# Patient Record
Sex: FEMALE | Race: BLACK OR AFRICAN AMERICAN | NOT HISPANIC OR LATINO | URBAN - METROPOLITAN AREA
[De-identification: names, ages, dates, MRNs, and addresses within clinical notes are randomized per-mention and may not be internally consistent; named-entity substitution may affect disease eponyms.]

---

## 2021-10-20 ENCOUNTER — HISTORICAL (OUTPATIENT)
Dept: ADMINISTRATIVE | Facility: HOSPITAL | Age: 46
End: 2021-10-20

## 2021-10-20 LAB
ABS NEUT (OLG): 2.65 X10(3)/MCL (ref 2.1–9.2)
BASOPHILS # BLD AUTO: 0 X10(3)/MCL (ref 0–0.2)
BASOPHILS NFR BLD AUTO: 1 %
EOSINOPHIL # BLD AUTO: 0.3 X10(3)/MCL (ref 0–0.9)
EOSINOPHIL NFR BLD AUTO: 6 %
ERYTHROCYTE [DISTWIDTH] IN BLOOD BY AUTOMATED COUNT: 17.2 % (ref 11.5–17)
HCT VFR BLD AUTO: 31.4 % (ref 37–47)
HGB BLD-MCNC: 8.6 GM/DL (ref 12–16)
IRON SATN MFR SERPL: 6 % (ref 20–50)
IRON SERPL-MCNC: 23 UG/DL (ref 50–170)
LYMPHOCYTES # BLD AUTO: 1.2 X10(3)/MCL (ref 0.6–4.6)
LYMPHOCYTES NFR BLD AUTO: 26 %
MCH RBC QN AUTO: 21.4 PG (ref 27–31)
MCHC RBC AUTO-ENTMCNC: 27.4 GM/DL (ref 33–36)
MCV RBC AUTO: 78.3 FL (ref 80–94)
MONOCYTES # BLD AUTO: 0.4 X10(3)/MCL (ref 0.1–1.3)
MONOCYTES NFR BLD AUTO: 8 %
NEUTROPHILS # BLD AUTO: 2.65 X10(3)/MCL (ref 2.1–9.2)
NEUTROPHILS NFR BLD AUTO: 59 %
PLATELET # BLD AUTO: 290 X10(3)/MCL (ref 130–400)
PMV BLD AUTO: 10.7 FL (ref 9.4–12.4)
RBC # BLD AUTO: 4.01 X10(6)/MCL (ref 4.2–5.4)
TIBC SERPL-MCNC: 385 UG/DL (ref 70–310)
TIBC SERPL-MCNC: 408 UG/DL (ref 250–450)
TRANSFERRIN SERPL-MCNC: 363 MG/DL (ref 180–382)
WBC # SPEC AUTO: 4.5 X10(3)/MCL (ref 4.5–11.5)

## 2021-11-23 LAB
HUMAN PAPILLOMAVIRUS (HPV): NORMAL
PAP RECOMMENDATION EXT: NORMAL
PAP SMEAR: NORMAL
POC BETA-HCG (QUAL): NEGATIVE

## 2021-12-07 ENCOUNTER — HISTORICAL (OUTPATIENT)
Dept: RADIOLOGY | Facility: HOSPITAL | Age: 46
End: 2021-12-07

## 2022-01-11 LAB — BCS RECOMMENDATION EXT: NORMAL

## 2022-01-19 LAB — POC BETA-HCG (QUAL): NEGATIVE

## 2022-07-28 ENCOUNTER — PATIENT OUTREACH (OUTPATIENT)
Dept: ADMINISTRATIVE | Facility: HOSPITAL | Age: 47
End: 2022-07-28
Payer: MEDICAID

## 2022-08-24 PROBLEM — D50.9 IRON DEFICIENCY ANEMIA, UNSPECIFIED: Status: ACTIVE | Noted: 2022-08-24

## 2022-08-25 ENCOUNTER — INFUSION (OUTPATIENT)
Dept: INFUSION THERAPY | Facility: HOSPITAL | Age: 47
End: 2022-08-25
Attending: NURSE PRACTITIONER
Payer: MEDICAID

## 2022-08-25 VITALS
HEART RATE: 89 BPM | OXYGEN SATURATION: 100 % | RESPIRATION RATE: 18 BRPM | BODY MASS INDEX: 26.31 KG/M2 | TEMPERATURE: 98 F | HEIGHT: 60 IN | WEIGHT: 134 LBS

## 2022-08-25 DIAGNOSIS — D50.9 IRON DEFICIENCY ANEMIA, UNSPECIFIED IRON DEFICIENCY ANEMIA TYPE: Primary | ICD-10-CM

## 2022-08-25 PROCEDURE — 96365 THER/PROPH/DIAG IV INF INIT: CPT

## 2022-08-25 PROCEDURE — 63600175 PHARM REV CODE 636 W HCPCS

## 2022-08-25 PROCEDURE — A4216 STERILE WATER/SALINE, 10 ML: HCPCS

## 2022-08-25 PROCEDURE — 25000003 PHARM REV CODE 250

## 2022-08-25 RX ORDER — SODIUM CHLORIDE 0.9 % (FLUSH) 0.9 %
10 SYRINGE (ML) INJECTION
Status: CANCELLED | OUTPATIENT
Start: 2022-09-01

## 2022-08-25 RX ORDER — SODIUM CHLORIDE 0.9 % (FLUSH) 0.9 %
10 SYRINGE (ML) INJECTION
Status: ACTIVE | OUTPATIENT
Start: 2022-08-25

## 2022-08-25 RX ORDER — HEPARIN 100 UNIT/ML
500 SYRINGE INTRAVENOUS
Status: CANCELLED | OUTPATIENT
Start: 2022-09-01

## 2022-08-25 RX ORDER — HEPARIN 100 UNIT/ML
500 SYRINGE INTRAVENOUS
Status: ACTIVE | OUTPATIENT
Start: 2022-08-25

## 2022-08-25 RX ADMIN — Medication 20 ML: at 10:08

## 2022-08-25 RX ADMIN — SODIUM CHLORIDE 125 MG: 9 INJECTION, SOLUTION INTRAVENOUS at 09:08

## 2022-09-06 ENCOUNTER — INFUSION (OUTPATIENT)
Dept: INFUSION THERAPY | Facility: HOSPITAL | Age: 47
End: 2022-09-06
Attending: NURSE PRACTITIONER
Payer: MEDICAID

## 2022-09-06 VITALS
HEART RATE: 85 BPM | OXYGEN SATURATION: 98 % | HEIGHT: 60 IN | WEIGHT: 134 LBS | TEMPERATURE: 98 F | BODY MASS INDEX: 26.31 KG/M2 | RESPIRATION RATE: 18 BRPM

## 2022-09-06 DIAGNOSIS — D50.9 IRON DEFICIENCY ANEMIA, UNSPECIFIED IRON DEFICIENCY ANEMIA TYPE: Primary | ICD-10-CM

## 2022-09-06 PROCEDURE — 63600175 PHARM REV CODE 636 W HCPCS

## 2022-09-06 PROCEDURE — A4216 STERILE WATER/SALINE, 10 ML: HCPCS

## 2022-09-06 PROCEDURE — 25000003 PHARM REV CODE 250

## 2022-09-06 PROCEDURE — 96365 THER/PROPH/DIAG IV INF INIT: CPT

## 2022-09-06 RX ORDER — SODIUM CHLORIDE 0.9 % (FLUSH) 0.9 %
10 SYRINGE (ML) INJECTION
Status: ACTIVE | OUTPATIENT
Start: 2022-09-06

## 2022-09-06 RX ORDER — HEPARIN 100 UNIT/ML
500 SYRINGE INTRAVENOUS
Status: CANCELLED | OUTPATIENT
Start: 2022-09-13

## 2022-09-06 RX ORDER — HEPARIN 100 UNIT/ML
500 SYRINGE INTRAVENOUS
Status: ACTIVE | OUTPATIENT
Start: 2022-09-06

## 2022-09-06 RX ORDER — SODIUM CHLORIDE 0.9 % (FLUSH) 0.9 %
10 SYRINGE (ML) INJECTION
Status: CANCELLED | OUTPATIENT
Start: 2022-09-13

## 2022-09-06 RX ADMIN — Medication 20 ML: at 10:09

## 2022-09-06 RX ADMIN — SODIUM CHLORIDE 125 MG: 9 INJECTION, SOLUTION INTRAVENOUS at 09:09

## 2022-09-06 RX ADMIN — Medication 10 ML: at 09:09

## 2022-09-07 ENCOUNTER — INFUSION (OUTPATIENT)
Dept: INFUSION THERAPY | Facility: HOSPITAL | Age: 47
End: 2022-09-07
Attending: NURSE PRACTITIONER
Payer: MEDICAID

## 2022-09-07 VITALS
TEMPERATURE: 98 F | RESPIRATION RATE: 18 BRPM | BODY MASS INDEX: 26.27 KG/M2 | WEIGHT: 133.81 LBS | HEIGHT: 60 IN | OXYGEN SATURATION: 99 % | HEART RATE: 79 BPM

## 2022-09-07 DIAGNOSIS — D50.9 IRON DEFICIENCY ANEMIA, UNSPECIFIED IRON DEFICIENCY ANEMIA TYPE: Primary | ICD-10-CM

## 2022-09-07 PROCEDURE — 25000003 PHARM REV CODE 250

## 2022-09-07 PROCEDURE — 63600175 PHARM REV CODE 636 W HCPCS

## 2022-09-07 PROCEDURE — 96365 THER/PROPH/DIAG IV INF INIT: CPT

## 2022-09-07 RX ORDER — HEPARIN 100 UNIT/ML
500 SYRINGE INTRAVENOUS
Status: ACTIVE | OUTPATIENT
Start: 2022-09-07

## 2022-09-07 RX ORDER — SODIUM CHLORIDE 0.9 % (FLUSH) 0.9 %
10 SYRINGE (ML) INJECTION
Status: CANCELLED | OUTPATIENT
Start: 2022-09-14

## 2022-09-07 RX ORDER — SODIUM CHLORIDE 0.9 % (FLUSH) 0.9 %
10 SYRINGE (ML) INJECTION
Status: ACTIVE | OUTPATIENT
Start: 2022-09-07

## 2022-09-07 RX ORDER — HEPARIN 100 UNIT/ML
500 SYRINGE INTRAVENOUS
Status: CANCELLED | OUTPATIENT
Start: 2022-09-14

## 2022-09-07 RX ADMIN — SODIUM CHLORIDE 125 MG: 9 INJECTION, SOLUTION INTRAVENOUS at 09:09

## 2022-09-13 ENCOUNTER — INFUSION (OUTPATIENT)
Dept: INFUSION THERAPY | Facility: HOSPITAL | Age: 47
End: 2022-09-13
Attending: NURSE PRACTITIONER
Payer: MEDICAID

## 2022-09-13 VITALS
HEART RATE: 75 BPM | HEIGHT: 60 IN | TEMPERATURE: 98 F | WEIGHT: 133.81 LBS | OXYGEN SATURATION: 99 % | BODY MASS INDEX: 26.27 KG/M2 | RESPIRATION RATE: 18 BRPM

## 2022-09-13 DIAGNOSIS — D50.9 IRON DEFICIENCY ANEMIA, UNSPECIFIED IRON DEFICIENCY ANEMIA TYPE: Primary | ICD-10-CM

## 2022-09-13 PROCEDURE — 96365 THER/PROPH/DIAG IV INF INIT: CPT

## 2022-09-13 PROCEDURE — 63600175 PHARM REV CODE 636 W HCPCS

## 2022-09-13 PROCEDURE — A4216 STERILE WATER/SALINE, 10 ML: HCPCS

## 2022-09-13 PROCEDURE — 25000003 PHARM REV CODE 250

## 2022-09-13 RX ORDER — SODIUM CHLORIDE 0.9 % (FLUSH) 0.9 %
10 SYRINGE (ML) INJECTION
Status: ACTIVE | OUTPATIENT
Start: 2022-09-13

## 2022-09-13 RX ORDER — HEPARIN 100 UNIT/ML
500 SYRINGE INTRAVENOUS
Status: CANCELLED | OUTPATIENT
Start: 2022-09-20

## 2022-09-13 RX ORDER — SODIUM CHLORIDE 0.9 % (FLUSH) 0.9 %
10 SYRINGE (ML) INJECTION
Status: CANCELLED | OUTPATIENT
Start: 2022-09-20

## 2022-09-13 RX ORDER — HEPARIN 100 UNIT/ML
500 SYRINGE INTRAVENOUS
Status: ACTIVE | OUTPATIENT
Start: 2022-09-13

## 2022-09-13 RX ADMIN — SODIUM CHLORIDE 125 MG: 9 INJECTION, SOLUTION INTRAVENOUS at 09:09

## 2022-09-13 RX ADMIN — Medication 20 ML: at 10:09

## 2022-09-13 RX ADMIN — Medication 10 ML: at 09:09

## 2022-09-15 ENCOUNTER — INFUSION (OUTPATIENT)
Dept: INFUSION THERAPY | Facility: HOSPITAL | Age: 47
End: 2022-09-15
Attending: NURSE PRACTITIONER
Payer: MEDICAID

## 2022-09-15 VITALS
HEART RATE: 83 BPM | WEIGHT: 134 LBS | BODY MASS INDEX: 26.31 KG/M2 | OXYGEN SATURATION: 100 % | RESPIRATION RATE: 18 BRPM | DIASTOLIC BLOOD PRESSURE: 74 MMHG | SYSTOLIC BLOOD PRESSURE: 124 MMHG | TEMPERATURE: 99 F | HEIGHT: 60 IN

## 2022-09-15 DIAGNOSIS — D50.9 IRON DEFICIENCY ANEMIA, UNSPECIFIED IRON DEFICIENCY ANEMIA TYPE: Primary | ICD-10-CM

## 2022-09-15 PROCEDURE — 25000003 PHARM REV CODE 250

## 2022-09-15 PROCEDURE — A4216 STERILE WATER/SALINE, 10 ML: HCPCS

## 2022-09-15 PROCEDURE — 63600175 PHARM REV CODE 636 W HCPCS

## 2022-09-15 PROCEDURE — 96365 THER/PROPH/DIAG IV INF INIT: CPT

## 2022-09-15 RX ORDER — SODIUM CHLORIDE 0.9 % (FLUSH) 0.9 %
10 SYRINGE (ML) INJECTION
Status: ACTIVE | OUTPATIENT
Start: 2022-09-15

## 2022-09-15 RX ORDER — HEPARIN 100 UNIT/ML
500 SYRINGE INTRAVENOUS
Status: CANCELLED | OUTPATIENT
Start: 2022-09-20

## 2022-09-15 RX ORDER — SODIUM CHLORIDE 0.9 % (FLUSH) 0.9 %
10 SYRINGE (ML) INJECTION
Status: CANCELLED | OUTPATIENT
Start: 2022-09-20

## 2022-09-15 RX ORDER — HEPARIN 100 UNIT/ML
500 SYRINGE INTRAVENOUS
Status: ACTIVE | OUTPATIENT
Start: 2022-09-15

## 2022-09-15 RX ADMIN — Medication 20 ML: at 09:09

## 2022-09-15 RX ADMIN — SODIUM CHLORIDE 125 MG: 9 INJECTION, SOLUTION INTRAVENOUS at 08:09

## 2022-09-15 RX ADMIN — Medication 10 ML: at 08:09

## 2022-09-20 ENCOUNTER — INFUSION (OUTPATIENT)
Dept: INFUSION THERAPY | Facility: HOSPITAL | Age: 47
End: 2022-09-20
Attending: NURSE PRACTITIONER
Payer: MEDICAID

## 2022-09-20 VITALS
OXYGEN SATURATION: 100 % | BODY MASS INDEX: 26.31 KG/M2 | HEART RATE: 74 BPM | WEIGHT: 134 LBS | SYSTOLIC BLOOD PRESSURE: 132 MMHG | TEMPERATURE: 98 F | HEIGHT: 60 IN | DIASTOLIC BLOOD PRESSURE: 76 MMHG | RESPIRATION RATE: 18 BRPM

## 2022-09-20 DIAGNOSIS — D50.9 IRON DEFICIENCY ANEMIA, UNSPECIFIED IRON DEFICIENCY ANEMIA TYPE: Primary | ICD-10-CM

## 2022-09-20 PROCEDURE — 25000003 PHARM REV CODE 250

## 2022-09-20 PROCEDURE — 96365 THER/PROPH/DIAG IV INF INIT: CPT

## 2022-09-20 PROCEDURE — 63600175 PHARM REV CODE 636 W HCPCS

## 2022-09-20 PROCEDURE — A4216 STERILE WATER/SALINE, 10 ML: HCPCS

## 2022-09-20 RX ORDER — HEPARIN 100 UNIT/ML
500 SYRINGE INTRAVENOUS
Status: ACTIVE | OUTPATIENT
Start: 2022-09-20

## 2022-09-20 RX ORDER — SODIUM CHLORIDE 0.9 % (FLUSH) 0.9 %
10 SYRINGE (ML) INJECTION
Status: ACTIVE | OUTPATIENT
Start: 2022-09-20

## 2022-09-20 RX ORDER — HEPARIN 100 UNIT/ML
500 SYRINGE INTRAVENOUS
Status: CANCELLED | OUTPATIENT
Start: 2022-09-27

## 2022-09-20 RX ORDER — SODIUM CHLORIDE 0.9 % (FLUSH) 0.9 %
10 SYRINGE (ML) INJECTION
Status: CANCELLED | OUTPATIENT
Start: 2022-09-27

## 2022-09-20 RX ADMIN — SODIUM CHLORIDE 125 MG: 9 INJECTION, SOLUTION INTRAVENOUS at 09:09

## 2022-09-20 RX ADMIN — Medication 30 ML: at 11:09

## 2022-09-22 ENCOUNTER — HISTORICAL (OUTPATIENT)
Dept: ADMINISTRATIVE | Facility: HOSPITAL | Age: 47
End: 2022-09-22
Payer: MEDICAID

## 2022-09-22 ENCOUNTER — INFUSION (OUTPATIENT)
Dept: INFUSION THERAPY | Facility: HOSPITAL | Age: 47
End: 2022-09-22
Attending: NURSE PRACTITIONER
Payer: MEDICAID

## 2022-09-22 VITALS
WEIGHT: 134 LBS | HEART RATE: 79 BPM | TEMPERATURE: 99 F | SYSTOLIC BLOOD PRESSURE: 117 MMHG | OXYGEN SATURATION: 100 % | HEIGHT: 60 IN | DIASTOLIC BLOOD PRESSURE: 69 MMHG | RESPIRATION RATE: 18 BRPM | BODY MASS INDEX: 26.31 KG/M2

## 2022-09-22 DIAGNOSIS — D50.9 IRON DEFICIENCY ANEMIA, UNSPECIFIED IRON DEFICIENCY ANEMIA TYPE: Primary | ICD-10-CM

## 2022-09-22 PROCEDURE — A4216 STERILE WATER/SALINE, 10 ML: HCPCS

## 2022-09-22 PROCEDURE — 25000003 PHARM REV CODE 250

## 2022-09-22 PROCEDURE — 63600175 PHARM REV CODE 636 W HCPCS

## 2022-09-22 PROCEDURE — 96365 THER/PROPH/DIAG IV INF INIT: CPT

## 2022-09-22 RX ORDER — SODIUM CHLORIDE 0.9 % (FLUSH) 0.9 %
10 SYRINGE (ML) INJECTION
Status: ACTIVE | OUTPATIENT
Start: 2022-09-22

## 2022-09-22 RX ORDER — HEPARIN 100 UNIT/ML
500 SYRINGE INTRAVENOUS
Status: ACTIVE | OUTPATIENT
Start: 2022-09-22

## 2022-09-22 RX ORDER — HEPARIN 100 UNIT/ML
500 SYRINGE INTRAVENOUS
Status: CANCELLED | OUTPATIENT
Start: 2022-09-27

## 2022-09-22 RX ORDER — SODIUM CHLORIDE 0.9 % (FLUSH) 0.9 %
10 SYRINGE (ML) INJECTION
Status: CANCELLED | OUTPATIENT
Start: 2022-09-27

## 2022-09-22 RX ADMIN — Medication 10 ML: at 09:09

## 2022-09-22 RX ADMIN — SODIUM CHLORIDE 125 MG: 9 INJECTION, SOLUTION INTRAVENOUS at 09:09

## 2022-09-22 RX ADMIN — Medication 30 ML: at 10:09

## 2022-09-27 ENCOUNTER — INFUSION (OUTPATIENT)
Dept: INFUSION THERAPY | Facility: HOSPITAL | Age: 47
End: 2022-09-27
Attending: NURSE PRACTITIONER
Payer: MEDICAID

## 2022-09-27 VITALS
RESPIRATION RATE: 18 BRPM | TEMPERATURE: 98 F | HEIGHT: 60 IN | OXYGEN SATURATION: 97 % | BODY MASS INDEX: 26.31 KG/M2 | DIASTOLIC BLOOD PRESSURE: 81 MMHG | WEIGHT: 134 LBS | SYSTOLIC BLOOD PRESSURE: 142 MMHG | HEART RATE: 68 BPM

## 2022-09-27 DIAGNOSIS — D50.9 IRON DEFICIENCY ANEMIA, UNSPECIFIED IRON DEFICIENCY ANEMIA TYPE: Primary | ICD-10-CM

## 2022-09-27 PROCEDURE — 96365 THER/PROPH/DIAG IV INF INIT: CPT

## 2022-09-27 PROCEDURE — 63600175 PHARM REV CODE 636 W HCPCS: Performed by: NURSE PRACTITIONER

## 2022-09-27 PROCEDURE — 25000003 PHARM REV CODE 250

## 2022-09-27 PROCEDURE — A4216 STERILE WATER/SALINE, 10 ML: HCPCS

## 2022-09-27 PROCEDURE — 25000003 PHARM REV CODE 250: Performed by: NURSE PRACTITIONER

## 2022-09-27 RX ORDER — HEPARIN 100 UNIT/ML
500 SYRINGE INTRAVENOUS
Status: ACTIVE | OUTPATIENT
Start: 2022-09-27

## 2022-09-27 RX ORDER — HEPARIN 100 UNIT/ML
500 SYRINGE INTRAVENOUS
Status: CANCELLED | OUTPATIENT
Start: 2022-09-27

## 2022-09-27 RX ADMIN — Medication 10 ML: at 09:09

## 2022-09-27 RX ADMIN — Medication 30 ML: at 10:09

## 2022-09-27 RX ADMIN — SODIUM CHLORIDE 125 MG: 9 INJECTION, SOLUTION INTRAVENOUS at 09:09

## 2024-04-15 ENCOUNTER — HOSPITAL ENCOUNTER (EMERGENCY)
Facility: HOSPITAL | Age: 49
Discharge: HOME | End: 2024-04-16
Attending: INTERNAL MEDICINE
Payer: COMMERCIAL

## 2024-04-15 VITALS
WEIGHT: 137.25 LBS | SYSTOLIC BLOOD PRESSURE: 151 MMHG | DIASTOLIC BLOOD PRESSURE: 65 MMHG | RESPIRATION RATE: 20 BRPM | OXYGEN SATURATION: 98 % | HEART RATE: 77 BPM | TEMPERATURE: 98 F | BODY MASS INDEX: 26.95 KG/M2 | HEIGHT: 60 IN

## 2024-04-15 DIAGNOSIS — D64.9 SYMPTOMATIC ANEMIA: ICD-10-CM

## 2024-04-15 DIAGNOSIS — N92.0 MENORRHAGIA WITH REGULAR CYCLE: Primary | ICD-10-CM

## 2024-04-15 DIAGNOSIS — R07.9 CHEST PAIN: ICD-10-CM

## 2024-04-15 LAB
ABO + RH BLD: NORMAL
ABORH RETYPE: NORMAL
ALBUMIN SERPL-MCNC: 3.8 G/DL (ref 3.5–5)
ALBUMIN/GLOB SERPL: 1.1 RATIO (ref 1.1–2)
ALP SERPL-CCNC: 142 UNIT/L (ref 40–150)
ALT SERPL-CCNC: 34 UNIT/L (ref 0–55)
ANISOCYTOSIS BLD QL SMEAR: ABNORMAL
AST SERPL-CCNC: 25 UNIT/L (ref 5–34)
BASOPHILS # BLD AUTO: 0.05 X10(3)/MCL
BASOPHILS NFR BLD AUTO: 1 %
BILIRUB SERPL-MCNC: 0.9 MG/DL
BLD PROD TYP BPU: NORMAL
BLOOD UNIT EXPIRATION DATE: NORMAL
BLOOD UNIT TYPE CODE: 5100
BNP BLD-MCNC: 22.2 PG/ML
BUN SERPL-MCNC: 4.5 MG/DL (ref 7–18.7)
CALCIUM SERPL-MCNC: 9.6 MG/DL (ref 8.4–10.2)
CHLORIDE SERPL-SCNC: 110 MMOL/L (ref 98–107)
CO2 SERPL-SCNC: 22 MMOL/L (ref 22–29)
CREAT SERPL-MCNC: 0.72 MG/DL (ref 0.55–1.02)
CROSSMATCH INTERPRETATION: NORMAL
DISPENSE STATUS: NORMAL
ELLIPTOCYTOSIS (OHS): SLIGHT
EOSINOPHIL # BLD AUTO: 0.3 X10(3)/MCL (ref 0–0.9)
EOSINOPHIL NFR BLD AUTO: 6.2 %
ERYTHROCYTE [DISTWIDTH] IN BLOOD BY AUTOMATED COUNT: 21.2 % (ref 11.5–17)
FLUAV AG UPPER RESP QL IA.RAPID: NOT DETECTED
FLUBV AG UPPER RESP QL IA.RAPID: NOT DETECTED
GFR SERPLBLD CREATININE-BSD FMLA CKD-EPI: >60 MLS/MIN/1.73/M2
GLOBULIN SER-MCNC: 3.4 GM/DL (ref 2.4–3.5)
GLUCOSE SERPL-MCNC: 90 MG/DL (ref 74–100)
GROUP & RH: NORMAL
HCT VFR BLD AUTO: 26.4 % (ref 37–47)
HGB BLD-MCNC: 6.9 G/DL (ref 12–16)
HOLD SPECIMEN: NORMAL
HYPOCHROMIA BLD QL SMEAR: ABNORMAL
IMM GRANULOCYTES # BLD AUTO: 0.01 X10(3)/MCL (ref 0–0.04)
IMM GRANULOCYTES NFR BLD AUTO: 0.2 %
INDIRECT COOMBS: NORMAL
LYMPHOCYTES # BLD AUTO: 1.45 X10(3)/MCL (ref 0.6–4.6)
LYMPHOCYTES NFR BLD AUTO: 29.8 %
MCH RBC QN AUTO: 18.2 PG (ref 27–31)
MCHC RBC AUTO-ENTMCNC: 26.1 G/DL (ref 33–36)
MCV RBC AUTO: 69.7 FL (ref 80–94)
MICROCYTES BLD QL SMEAR: ABNORMAL
MONOCYTES # BLD AUTO: 0.45 X10(3)/MCL (ref 0.1–1.3)
MONOCYTES NFR BLD AUTO: 9.2 %
NEUTROPHILS # BLD AUTO: 2.61 X10(3)/MCL (ref 2.1–9.2)
NEUTROPHILS NFR BLD AUTO: 53.6 %
NRBC BLD AUTO-RTO: 0 %
OVALOCYTES (OLG): ABNORMAL
PLATELET # BLD AUTO: 215 X10(3)/MCL (ref 130–400)
PLATELET # BLD EST: NORMAL 10*3/UL
PMV BLD AUTO: 9.3 FL (ref 7.4–10.4)
POIKILOCYTOSIS BLD QL SMEAR: ABNORMAL
POTASSIUM SERPL-SCNC: 3.4 MMOL/L (ref 3.5–5.1)
PROT SERPL-MCNC: 7.2 GM/DL (ref 6.4–8.3)
RBC # BLD AUTO: 3.79 X10(6)/MCL (ref 4.2–5.4)
RBC MORPH BLD: ABNORMAL
SARS-COV-2 RNA RESP QL NAA+PROBE: NOT DETECTED
SODIUM SERPL-SCNC: 141 MMOL/L (ref 136–145)
SPECIMEN OUTDATE: NORMAL
TROPONIN I SERPL-MCNC: <0.01 NG/ML (ref 0–0.04)
TSH SERPL-ACNC: 1.13 UIU/ML (ref 0.35–4.94)
UNIT NUMBER: NORMAL
WBC # SPEC AUTO: 4.87 X10(3)/MCL (ref 4.5–11.5)

## 2024-04-15 PROCEDURE — 86923 COMPATIBILITY TEST ELECTRIC: CPT | Performed by: INTERNAL MEDICINE

## 2024-04-15 PROCEDURE — P9016 RBC LEUKOCYTES REDUCED: HCPCS | Performed by: INTERNAL MEDICINE

## 2024-04-15 PROCEDURE — 84484 ASSAY OF TROPONIN QUANT: CPT | Performed by: NURSE PRACTITIONER

## 2024-04-15 PROCEDURE — 80053 COMPREHEN METABOLIC PANEL: CPT | Performed by: NURSE PRACTITIONER

## 2024-04-15 PROCEDURE — 99285 EMERGENCY DEPT VISIT HI MDM: CPT | Mod: 25

## 2024-04-15 PROCEDURE — 83880 ASSAY OF NATRIURETIC PEPTIDE: CPT | Performed by: NURSE PRACTITIONER

## 2024-04-15 PROCEDURE — 85025 COMPLETE CBC W/AUTO DIFF WBC: CPT | Performed by: NURSE PRACTITIONER

## 2024-04-15 PROCEDURE — 86901 BLOOD TYPING SEROLOGIC RH(D): CPT | Performed by: INTERNAL MEDICINE

## 2024-04-15 PROCEDURE — 84443 ASSAY THYROID STIM HORMONE: CPT | Performed by: INTERNAL MEDICINE

## 2024-04-15 PROCEDURE — 36430 TRANSFUSION BLD/BLD COMPNT: CPT

## 2024-04-15 PROCEDURE — 0240U COVID/FLU A&B PCR: CPT | Performed by: NURSE PRACTITIONER

## 2024-04-15 PROCEDURE — 93005 ELECTROCARDIOGRAM TRACING: CPT

## 2024-04-15 RX ORDER — GUAIFENESIN AND DEXTROMETHORPHAN HYDROBROMIDE 10; 100 MG/5ML; MG/5ML
5 SYRUP ORAL 4 TIMES DAILY PRN
Qty: 354 ML | Refills: 0 | Status: SHIPPED | OUTPATIENT
Start: 2024-04-15 | End: 2024-04-25

## 2024-04-15 RX ORDER — HYDROCODONE BITARTRATE AND ACETAMINOPHEN 500; 5 MG/1; MG/1
TABLET ORAL
Status: DISCONTINUED | OUTPATIENT
Start: 2024-04-15 | End: 2024-04-16 | Stop reason: HOSPADM

## 2024-04-15 RX ORDER — FERROUS SULFATE 325(65) MG
325 TABLET ORAL 2 TIMES DAILY
Qty: 60 TABLET | Refills: 1 | Status: SHIPPED | OUTPATIENT
Start: 2024-04-15 | End: 2024-06-14

## 2024-04-15 RX ORDER — MEDROXYPROGESTERONE ACETATE 150 MG/ML
150 INJECTION, SUSPENSION INTRAMUSCULAR ONCE
Status: COMPLETED | OUTPATIENT
Start: 2024-04-16 | End: 2024-04-15

## 2024-04-15 RX ORDER — DOCUSATE SODIUM 100 MG/1
100 CAPSULE, LIQUID FILLED ORAL 2 TIMES DAILY PRN
Qty: 20 CAPSULE | Refills: 0 | Status: SHIPPED | OUTPATIENT
Start: 2024-04-15

## 2024-04-15 RX ADMIN — MEDROXYPROGESTERONE ACETATE 150 MG: 150 INJECTION, SUSPENSION INTRAMUSCULAR at 11:04

## 2024-04-15 NOTE — Clinical Note
"Vanesa Andradea" Kamlesh was seen and treated in our emergency department on 4/15/2024.  She may return to work on 04/17/2024.       If you have any questions or concerns, please don't hesitate to call.      CT RN    "

## 2024-04-15 NOTE — ED PROVIDER NOTES
Encounter Date: 4/15/2024       History     Chief Complaint   Patient presents with    Chest Pain     Pt c/o chest pain, sob x 24 hours.  Reports increase in symptoms since last night. Hx of severe anemia.     Pt is a 49 y.o. female who presents to the [facility] ED complaining of chest pain and SOB which began yesterday. Denies fever, cough, weakness, dizziness, abdominal pain, or loss of bowel or bladder control. Hx of anemia. Denies free bleeding, rectal bleeding, black stool, or maroon stool.        Review of patient's allergies indicates:  No Known Allergies  No past medical history on file.  Past Surgical History:   Procedure Laterality Date    BILATERAL TUBAL LIGATION       SECTION       Family History   Problem Relation Name Age of Onset    Hypertension Father       Social History     Tobacco Use    Smoking status: Never    Smokeless tobacco: Never     Review of Systems   Constitutional:  Negative for chills, diaphoresis, fatigue and fever.   HENT:  Negative for facial swelling, rhinorrhea, sinus pressure, sinus pain, sore throat and trouble swallowing.    Respiratory:  Positive for shortness of breath. Negative for cough, chest tightness and wheezing.    Cardiovascular:  Positive for chest pain. Negative for palpitations and leg swelling.   Gastrointestinal:  Negative for abdominal pain, diarrhea, nausea and vomiting.   Genitourinary:  Negative for dysuria, flank pain, frequency, hematuria and urgency.   Musculoskeletal:  Negative for arthralgias, back pain, joint swelling and myalgias.   Skin:  Negative for color change and rash.   Neurological:  Negative for dizziness, syncope, weakness and light-headedness.   Hematological:  Does not bruise/bleed easily.   All other systems reviewed and are negative.      Physical Exam     Initial Vitals [04/15/24 1631]   BP Pulse Resp Temp SpO2   114/68 96 16 98.8 °F (37.1 °C) 100 %      MAP       --         Physical Exam    Nursing note and vitals  reviewed.  Constitutional: She appears well-developed and well-nourished.   HENT:   Head: Normocephalic and atraumatic.   Nose: Nose normal.   Mouth/Throat: Oropharynx is clear and moist.   Eyes: EOM are normal. Pupils are equal, round, and reactive to light.   Pale conjunctiva   Neck: Neck supple. No thyromegaly present. No JVD present.   Normal range of motion.  Cardiovascular:  Normal rate, regular rhythm and intact distal pulses.           Murmur (Grade 2) heard.  Pulmonary/Chest: Effort normal and breath sounds normal. No respiratory distress. She has no wheezes. She has no rhonchi. She has no rales. She exhibits tenderness. She exhibits no edema, no deformity, no swelling and no retraction.     Abdominal: Abdomen is soft and flat. Bowel sounds are normal. She exhibits no distension. There is no abdominal tenderness. There is no rebound, no guarding, no tenderness at McBurney's point and negative Clifford's sign. negative psoas sign  Musculoskeletal:         General: Normal range of motion.      Cervical back: Normal range of motion and neck supple.     Neurological: She is alert and oriented to person, place, and time. She has normal strength and normal reflexes. GCS score is 15. GCS eye subscore is 4. GCS verbal subscore is 5. GCS motor subscore is 6.   Skin: Skin is warm and dry. Capillary refill takes less than 2 seconds. There is pallor.   Psychiatric: She has a normal mood and affect. Her speech is normal and behavior is normal. Judgment and thought content normal.         ED Course   Procedures  Labs Reviewed   COMPREHENSIVE METABOLIC PANEL - Abnormal; Notable for the following components:       Result Value    Potassium Level 3.4 (*)     Chloride 110 (*)     Blood Urea Nitrogen 4.5 (*)     All other components within normal limits   CBC WITH DIFFERENTIAL - Abnormal; Notable for the following components:    RBC 3.79 (*)     Hgb 6.9 (*)     Hct 26.4 (*)     MCV 69.7 (*)     MCH 18.2 (*)     MCHC 26.1 (*)      RDW 21.2 (*)     All other components within normal limits   BLOOD SMEAR MICROSCOPIC EXAM (OLG) - Abnormal; Notable for the following components:    RBC Morph Abnormal (*)     Anisocytosis 1+ (*)     Elliptocytosis Slight (*)     Hypochromasia 1+ (*)     Microcytosis 1+ (*)     Ovalocytes 1+ (*)     Poikilocytosis 1+ (*)     All other components within normal limits   TROPONIN I - Normal   B-TYPE NATRIURETIC PEPTIDE - Normal   COVID/FLU A&B PCR - Normal    Narrative:     The Xpert Xpress SARS-CoV-2/FLU/RSV plus is a rapid, multiplexed real-time PCR test intended for the simultaneous qualitative detection and differentiation of SARS-CoV-2, Influenza A, Influenza B, and respiratory syncytial virus (RSV) viral RNA in either nasopharyngeal swab or nasal swab specimens.         TSH - Normal   CBC W/ AUTO DIFFERENTIAL    Narrative:     The following orders were created for panel order CBC auto differential.  Procedure                               Abnormality         Status                     ---------                               -----------         ------                     CBC with Differential[[id number]]       Abnormal            Final result                 Please view results for these tests on the individual orders.   EXTRA TUBES    Narrative:     The following orders were created for panel order EXTRA TUBES.  Procedure                               Abnormality         Status                     ---------                               -----------         ------                     Light Blue Top Hold[[id number]]                             Final result                 Please view results for these tests on the individual orders.   LIGHT BLUE TOP HOLD   TYPE & SCREEN   ABORH RETYPE   PREPARE RBC SOFT        ECG Results              EKG 12-lead (In process)        Collection Time Result Time QRS Duration [facility] QTC Calculation    04/15/24 16:40:46 04/15/24 16:58:14 74 464                     In process by  Interface, Lab In [facility] (04/15/24 16:58:21)                   Narrative:    Test Reason : R07.9,    Vent. Rate : 090 BPM     Atrial Rate : 090 BPM     P-R Int : 152 ms          QRS Dur : 074 ms      QT Int : 380 ms       P-R-T Axes : 026 017 010 degrees     QTc Int : 464 ms    Normal sinus rhythm  Possible Left atrial enlargement  Cannot rule out Anterior infarct ,age undetermined  Abnormal ECG  No previous ECGs available    Referred By:             Confirmed By:                                   Imaging Results              X-Ray Chest 1 View (Final result)  Result time 04/15/24 17:29:44      Final result by Rafa Medrano MD (04/15/24 17:29:44)                   Impression:      No acute findings.      Electronically signed by: Rafa Medrano  Date:    04/15/2024  Time:    17:29               Narrative:    EXAMINATION:  XR CHEST 1 VIEW    CLINICAL HISTORY:  Chest pain, unspecified    COMPARISON:  27 September 2021    FINDINGS:  Frontal view of the chest was obtained. Heart size upper limit normal.  Grossly clear lungs.  No pneumothorax.                                       Medications   0.9%  NaCl infusion (for blood administration) (has no administration in time range)   medroxyPROGESTERone (DEPO-PROVERA) injection 150 mg (has no administration in time range)     Medical Decision Making  Differential:  Anemia  Electrolyte imbalance  AMI  Chest pain  Pneumonia  URI  COVID  CHF    Amount and/or Complexity of Data Reviewed  External Data Reviewed: radiology.     Details: ULTRASOUND PELVIS NON OB COMPLETE:      HISTORY: Other (please specify)     FINDINGS:  The uterus is enlarged lobular difficult to measure sonographically  approximately 17 cm length. Echotexture is heterogeneous with multiple  mural masses largest measurable lesion 5 cm. Ovaries not visualized.  No ascites appreciated.     IMPRESSION:  Enlarged lobular uterus          Electronically Signed By: Anthony PARIS, Marlin Chicas  Date/Time Signed:  12/07/2021 14:24        Specimen Collected: 12/07/21 12:39 CST          Labs: ordered. Decision-making details documented in ED Course.  Radiology: ordered and independent interpretation performed. Decision-making details documented in ED Course.  ECG/medicine tests: ordered and independent interpretation performed. Decision-making details documented in ED Course.    Risk  Prescription drug management.      Additional MDM:   Differential Diagnosis:   Miocardial infarction, pneumothorax, aortic dissection, pulmonary emboli, pneumonia, among others             ED Course as of 04/15/24 2334   Mon Apr 15, 2024   1800 Pt care assumed by Dr. MCKAYLA Armstrong, ED attending, at this time. [JA]      ED Course User Index  [JA] Rafa De La Cruz Jr., FNP                           Clinical Impression:  Final diagnoses:  [R07.9] Chest pain  [N92.0] Menorrhagia with regular cycle (Primary)  [D64.9] Symptomatic anemia          ED Disposition Condition    Discharge Stable          ED Prescriptions       Medication Sig Dispense Start Date End Date Auth. Provider    ferrous sulfate (FEOSOL) 325 mg (65 mg iron) Tab tablet Take 1 tablet (325 mg total) by mouth 2 (two) times daily. 60 tablet 4/15/2024 6/14/2024 Elmer Lou MD    docusate sodium (COLACE) 100 MG capsule Take 1 capsule (100 mg total) by mouth 2 (two) times daily as needed for Constipation. 20 capsule 4/15/2024 -- Elmer Lou MD          Follow-up Information       Follow up With Specialties Details Why Contact Info    [facility] Emergency Dept Emergency Medicine  If symptoms worsen [address] [facility] [id number] [phone]    [facility] - GYN Gynecology Schedule an appointment as soon as possible for a visit in 2 months  [address] [facility] [address] [facility] [id number] [phone]             Elmer Lou MD  04/15/24 8193

## 2024-04-16 PROBLEM — Z76.89 ENCOUNTER TO ESTABLISH CARE: Status: ACTIVE | Noted: 2024-04-16

## 2024-04-16 PROBLEM — D50.9 HYPOCHROMIC MICROCYTIC ANEMIA: Status: ACTIVE | Noted: 2024-04-16

## 2024-04-16 LAB
OHS QRS DURATION: 74 MS
OHS QTC CALCULATION: 464 MS

## 2024-04-16 PROCEDURE — 25000003 PHARM REV CODE 250: Performed by: INTERNAL MEDICINE

## 2024-04-16 PROCEDURE — 96372 THER/PROPH/DIAG INJ SC/IM: CPT | Performed by: INTERNAL MEDICINE

## 2024-04-22 ENCOUNTER — TELEPHONE (OUTPATIENT)
Dept: GYNECOLOGY | Facility: CLINIC | Age: 49
End: 2024-04-22

## 2024-04-22 NOTE — TELEPHONE ENCOUNTER
Called and lvm to call back to schedule appt. Will try to call again.       ----- Message from Amy Cooper MD sent at 4/17/2024  3:44 PM CDT -----  Enlarged uterus, severe anemia

## 2024-05-31 DIAGNOSIS — Z12.11 SCREEN FOR COLON CANCER: Primary | ICD-10-CM

## 2024-06-11 RX ORDER — POLYETHYLENE GLYCOL 3350, SODIUM SULFATE, SODIUM CHLORIDE, POTASSIUM CHLORIDE, SODIUM ASCORBATE, AND ASCORBIC ACID 7.5-2.691G
KIT ORAL
Qty: 1 KIT | Refills: 0 | Status: SHIPPED | OUTPATIENT
Start: 2024-06-11

## 2024-07-08 ENCOUNTER — TELEPHONE (OUTPATIENT)
Dept: ENDOSCOPY | Facility: HOSPITAL | Age: 49
End: 2024-07-08
Payer: COMMERCIAL

## 2024-07-08 NOTE — TELEPHONE ENCOUNTER
"Called [facility]([phone]) to check CRA status. Spoke with Ms. Ortiz who states, " [facility] tried contacting pt, 07/01/24 and 07/02/24 to schedule echo and stress test, pt haven't returned calls." MS  "

## 2024-07-18 ENCOUNTER — ANESTHESIA EVENT (OUTPATIENT)
Dept: ENDOSCOPY | Facility: HOSPITAL | Age: 49
End: 2024-07-18
Payer: COMMERCIAL

## 2024-07-18 RX ORDER — SODIUM CHLORIDE, SODIUM LACTATE, POTASSIUM CHLORIDE, CALCIUM CHLORIDE 600; 310; 30; 20 MG/100ML; MG/100ML; MG/100ML; MG/100ML
INJECTION, SOLUTION INTRAVENOUS CONTINUOUS
Status: CANCELLED | OUTPATIENT
Start: 2024-07-18

## 2024-07-18 NOTE — ANESTHESIA PREPROCEDURE EVALUATION
2024  Vanesa Whitlock is a 49 y.o., female. For CLN      UPT status NEG DOS     Active Ambulatory Problems     Diagnosis Date Noted    Iron deficiency anemia, unspecified 2022    Encounter to establish care 2024    Hypochromic microcytic anemia 2024     Resolved Ambulatory Problems     Diagnosis Date Noted    No Resolved Ambulatory Problems     No Additional Past Medical History       Past Surgical History:   Procedure Laterality Date    BILATERAL TUBAL LIGATION       SECTION             Lab Results   Component Value Date    WBC 4.87 04/15/2024    HGB 6.9 (L) 04/15/2024    HCT 26.4 (L) 04/15/2024     04/15/2024    ALT 34 04/15/2024    AST 25 04/15/2024     04/15/2024    K 3.4 (L) 04/15/2024     (H) 04/15/2024    CREATININE 0.72 04/15/2024    BUN 4.5 (L) 04/15/2024    CO2 22 04/15/2024    TSH 1.133 04/15/2024       Current Facility-Administered Medications on File Prior to Encounter   Medication Dose Route Frequency Provider Last Rate Last Admin    ferric gluconate (FERRLECIT) 125 mg in sodium chloride 0.9% 100 mL IVPB  125 mg Intravenous 1 time in Clinic/HOD Order, Paper        heparin, porcine (PF) 100 unit/mL injection flush 500 Units  500 Units Intravenous PRN Order, Paper        heparin, porcine (PF) 100 unit/mL injection flush 500 Units  500 Units Intravenous PRN Order, Paper        heparin, porcine (PF) 100 unit/mL injection flush 500 Units  500 Units Intravenous PRN Order, Paper        heparin, porcine (PF) 100 unit/mL injection flush 500 Units  500 Units Intravenous PRN Order, Paper        heparin, porcine (PF) 100 unit/mL injection flush 500 Units  500 Units Intravenous PRN Order, Paper        heparin, porcine (PF) 100 unit/mL injection flush 500 Units  500 Units Intravenous PRN Order, Paper        heparin, porcine (PF) 100 unit/mL injection flush 500  Units  500 Units Intravenous PRN Order, Paper        heparin, porcine (PF) 100 unit/mL injection flush 500 Units  500 Units Intravenous PRN Order, Paper        sodium chloride 0.9% 100 mL flush bag   Intravenous PRN Order, Paper        sodium chloride 0.9% 100 mL flush bag   Intravenous PRN Order, Paper        sodium chloride 0.9% 100 mL flush bag   Intravenous PRN Order, Paper        sodium chloride 0.9% 100 mL flush bag   Intravenous PRN Order, Paper        sodium chloride 0.9% 100 mL flush bag   Intravenous PRN Order, Paper        sodium chloride 0.9% 100 mL flush bag   Intravenous PRN Order, Paper        sodium chloride 0.9% 100 mL flush bag   Intravenous PRN Order, Paper        sodium chloride 0.9% 100 mL flush bag   Intravenous PRN Order, Paper        sodium chloride 0.9% flush 10 mL  10 mL Intravenous PRN Order, Paper   20 mL at 08/25/22 1008    sodium chloride 0.9% flush 10 mL  10 mL Intravenous PRN Order, Paper   10 mL at 09/06/22 0935    sodium chloride 0.9% flush 10 mL  10 mL Intravenous PRN Order, Paper   20 mL at 09/06/22 1046    sodium chloride 0.9% flush 10 mL  10 mL Intravenous PRN Order, Paper   30 mL at 09/27/22 1045    sodium chloride 0.9% flush 10 mL  10 mL Intravenous PRN Order, Paper   20 mL at 09/13/22 1030    sodium chloride 0.9% flush 10 mL  10 mL Intravenous PRN Order, Paper   20 mL at 09/15/22 0958    sodium chloride 0.9% flush 10 mL  10 mL Intravenous PRN Order, Paper   30 mL at 09/20/22 1114    sodium chloride 0.9% flush 10 mL  10 mL Intravenous PRN Order, Paper   30 mL at 09/22/22 1036     Current Outpatient Medications on File Prior to Encounter   Medication Sig Dispense Refill    docusate sodium (COLACE) 100 MG capsule Take 1 capsule (100 mg total) by mouth 2 (two) times daily as needed for Constipation. 20 capsule 0         Pre-op Assessment    I have reviewed the Patient Summary Reports.     I have reviewed the Nursing Notes. I have reviewed the NPO Status.   I have reviewed the  Medications.     Review of Systems  Anesthesia Hx:  No problems with previous Anesthesia   History of prior surgery of interest to airway management or planning:          Denies Family Hx of Anesthesia complications.    Denies Personal Hx of Anesthesia complications.                    Hematology/Oncology:  Hematology Normal   Oncology Normal                                   EENT/Dental:  EENT/Dental Normal           Cardiovascular:  Cardiovascular Normal                                            Pulmonary:  Pulmonary Normal                       Renal/:  Renal/ Normal                 Hepatic/GI:  Hepatic/GI Normal                 Musculoskeletal:  Musculoskeletal Normal                Neurological:  Neurology Normal                                      Endocrine:  Endocrine Normal            Dermatological:  Skin Normal    Psych:  Psychiatric Normal                  Physical Exam  General: Well nourished, Cooperative, Alert and Oriented    Airway:  Mallampati: I / I  Mouth Opening: Normal  TM Distance: Normal  Tongue: Normal  Neck ROM: Normal ROM    Dental:  Intact        Anesthesia Plan  Type of Anesthesia, risks & benefits discussed:    Anesthesia Type: Gen Natural Airway  Intra-op Monitoring Plan: Standard ASA Monitors  Post Op Pain Control Plan: IV/PO Opioids PRN  (medical reason for not using multimodal pain management)  Induction:  IV  Informed Consent: Informed consent signed with the Patient and all parties understand the risks and agree with anesthesia plan.  All questions answered. Patient consented to blood products? No  ASA Score: 2  Day of Surgery Review of History & Physical: H&P Update referred to the surgeon/provider.    Ready For Surgery From Anesthesia Perspective.     .

## 2024-07-19 ENCOUNTER — HOSPITAL ENCOUNTER (OUTPATIENT)
Facility: HOSPITAL | Age: 49
Discharge: HOME | End: 2024-07-19
Attending: INTERNAL MEDICINE | Admitting: INTERNAL MEDICINE
Payer: COMMERCIAL

## 2024-07-19 ENCOUNTER — ANESTHESIA (OUTPATIENT)
Dept: ENDOSCOPY | Facility: HOSPITAL | Age: 49
End: 2024-07-19
Payer: COMMERCIAL

## 2024-07-19 VITALS
HEART RATE: 64 BPM | SYSTOLIC BLOOD PRESSURE: 128 MMHG | RESPIRATION RATE: 18 BRPM | OXYGEN SATURATION: 100 % | TEMPERATURE: 98 F | WEIGHT: 142.81 LBS | DIASTOLIC BLOOD PRESSURE: 68 MMHG | BODY MASS INDEX: 26.96 KG/M2 | HEIGHT: 61 IN

## 2024-07-19 DIAGNOSIS — Z12.11 SCREENING FOR COLON CANCER: Primary | ICD-10-CM

## 2024-07-19 DIAGNOSIS — D50.9 IRON DEFICIENCY ANEMIA, UNSPECIFIED IRON DEFICIENCY ANEMIA TYPE: ICD-10-CM

## 2024-07-19 LAB
B-HCG UR QL: NEGATIVE
CTP QC/QA: YES

## 2024-07-19 PROCEDURE — 37000009 HC ANESTHESIA EA ADD 15 MINS: Performed by: INTERNAL MEDICINE

## 2024-07-19 PROCEDURE — 81025 URINE PREGNANCY TEST: CPT | Performed by: INTERNAL MEDICINE

## 2024-07-19 PROCEDURE — 63600175 PHARM REV CODE 636 W HCPCS

## 2024-07-19 PROCEDURE — 45378 DIAGNOSTIC COLONOSCOPY: CPT | Performed by: INTERNAL MEDICINE

## 2024-07-19 PROCEDURE — 25000003 PHARM REV CODE 250: Performed by: NURSE ANESTHETIST, CERTIFIED REGISTERED

## 2024-07-19 PROCEDURE — 37000008 HC ANESTHESIA 1ST 15 MINUTES: Performed by: INTERNAL MEDICINE

## 2024-07-19 PROCEDURE — 63600175 PHARM REV CODE 636 W HCPCS: Performed by: SPECIALIST

## 2024-07-19 RX ORDER — PROPOFOL 10 MG/ML
INJECTION, EMULSION INTRAVENOUS CONTINUOUS PRN
Status: DISCONTINUED | OUTPATIENT
Start: 2024-07-19 | End: 2024-07-19

## 2024-07-19 RX ORDER — SODIUM CHLORIDE, SODIUM LACTATE, POTASSIUM CHLORIDE, CALCIUM CHLORIDE 600; 310; 30; 20 MG/100ML; MG/100ML; MG/100ML; MG/100ML
INJECTION, SOLUTION INTRAVENOUS CONTINUOUS
Status: DISCONTINUED | OUTPATIENT
Start: 2024-07-19 | End: 2024-07-19 | Stop reason: HOSPADM

## 2024-07-19 RX ORDER — LIDOCAINE HYDROCHLORIDE 20 MG/ML
INJECTION INTRAVENOUS
Status: DISCONTINUED | OUTPATIENT
Start: 2024-07-19 | End: 2024-07-19

## 2024-07-19 RX ADMIN — LIDOCAINE HYDROCHLORIDE 50 MG: 20 INJECTION INTRAVENOUS at 07:07

## 2024-07-19 RX ADMIN — PROPOFOL 120 MCG/KG/MIN: 10 INJECTION, EMULSION INTRAVENOUS at 07:07

## 2024-07-19 RX ADMIN — SODIUM CHLORIDE, POTASSIUM CHLORIDE, SODIUM LACTATE AND CALCIUM CHLORIDE: 600; 310; 30; 20 INJECTION, SOLUTION INTRAVENOUS at 07:07

## 2024-07-19 NOTE — ANESTHESIA POSTPROCEDURE EVALUATION
Anesthesia Post Evaluation    Patient: Vanesa Whitlock    Procedure(s) Performed: Procedure(s) (LRB):  COLONOSCOPY (N/A)    Final Anesthesia Type: general      Patient location during evaluation: GI PACU  Patient participation: Yes- Able to Participate  Level of consciousness: awake and alert  Post-procedure vital signs: reviewed and stable  Pain management: adequate  Airway patency: patent    PONV status at discharge: No PONV  Anesthetic complications: no      Cardiovascular status: blood pressure returned to baseline  Respiratory status: unassisted  Hydration status: euvolemic  Follow-up not needed.              Vitals Value Taken Time   /81 07/19/24 0640   Temp 36.6 °C (97.9 °F) 07/19/24 0640   Pulse 64 07/19/24 0640   Resp 15 07/19/24 0640   SpO2 100 % 07/19/24 0640         No case tracking events are documented in the log.      Pain/Lonny Score: No data recorded

## 2024-07-19 NOTE — PROVATION PATIENT INSTRUCTIONS
Discharge Summary/Instructions after an Endoscopic Procedure  Patient Name: Vanesa Whitlock  Patient MRN: [id number]  Patient YOB: 1975 Friday, July 19, 2024  OLESYA Price MD  Dear patient,  As a result of recent federal legislation (The Federal Cures Act), you may   receive lab or pathology results from your procedure in your [facility]   account before your physician is able to contact you. Your physician or   their representative will relay the results to you with their   recommendations at their soonest availability.  Thank you,  RESTRICTIONS:  During your procedure today, you received medications for sedation.  These   medications may affect your judgment, balance and coordination.  Therefore,   for 24 hours, you have the following restrictions:   - DO NOT drive a car, operate machinery, make legal/financial decisions,   sign important papers or drink alcohol.    ACTIVITY:  Today: no heavy lifting, straining or running due to procedural   sedation/anesthesia.  The following day: return to full activity including work.  DIET:  Eat and drink normally unless instructed otherwise.     TREATMENT FOR COMMON SIDE EFFECTS:  - Mild abdominal pain, nausea, belching, bloating or excessive gas:  rest,   eat lightly and use a heating pad.  - Sore Throat: treat with throat lozenges and/or gargle with warm salt   water.  - Because air was used during the procedure, expelling large amounts of air   from your rectum or belching is normal.  - If a bowel prep was taken, you may not have a bowel movement for 1-3 days.    This is normal.  SYMPTOMS TO WATCH FOR AND REPORT TO YOUR PHYSICIAN:  1. Abdominal pain or bloating, other than gas cramps.  2. Chest pain.  3. Back pain.  4. Signs of infection such as: chills or fever occurring within 24 hours   after the procedure.  5. Rectal bleeding, which would show as bright red, maroon, or black stools.   (A tablespoon of blood from the rectum is not serious, especially if    hemorrhoids are present.)  6. Vomiting.  7. Weakness or dizziness.  GO DIRECTLY TO THE NEAREST EMERGENCY ROOM IF YOU HAVE ANY OF THE FOLLOWING:      Difficulty breathing              Chills and/or fever over 101 F   Persistent vomiting and/or vomiting blood   Severe abdominal pain   Severe chest pain   Black, tarry stools   Bleeding- more than one tablespoon   Any other symptom or condition that you feel may need urgent attention  Your doctor recommends these additional instructions:  If any biopsies were taken, your doctors clinic will contact you in 1 to 2   weeks with any results.  - Resume previous diet.   - Continue present medications.   - Discharge patient to home (via wheelchair).   - Repeat colonoscopy in 7-10 years per protocol.  For questions, problems or results please call your physician - OLESYA Price MD at Work:  [phone].  [facility] , EMERGENCY ROOM PHONE NUMBER: [phone]  IF A COMPLICATION OR EMERGENCY SITUATION ARISES AND YOU ARE UNABLE TO REACH   YOUR PHYSICIAN - GO DIRECTLY TO THE EMERGENCY ROOM.  OLESYA Price MD  7/19/2024 8:08:59 AM  This report has been verified and signed electronically.  Dear patient,  As a result of recent federal legislation (The Federal Cures Act), you may   receive lab or pathology results from your procedure in your [facility]   account before your physician is able to contact you. Your physician or   their representative will relay the results to you with their   recommendations at their soonest availability.  Thank you,  PROVATION

## 2024-07-19 NOTE — H&P
Colonoscopy History and Physical    Patient Name: Vanesa Whitlock  MRN: [id number]  : 1975  Date of Procedure:  2024  Referring Physician: BRIANNA Price MD  Primary Physician: Sbarina, Primary Doctor  Procedure Physician: BRIANNA Price MD    Procedure - Colonoscopy  ASA - per anesthesia  Mallampati - per anesthesia  History of Anesthesia problems - no  Family history Anesthesia problems -  no   Plan of anesthesia - General    Diagnosis: Screening for colon cancer  Chief Complaint: Same as above    HPI: Patient is an 49 y.o. female is here for the above. She has been NPO since midnight. States she completed her bowel prep without any issues. Confirms the procedure to be performed.     Last colonoscopy: n/a  Family history: non-contributory  Anticoagulation: none    ROS:  Constitutional: No fevers, chills, No weight loss  CV: No chest pain  Pulm: No cough, No shortness of breath  GI: see HPI    Medical History:   Past Medical History:   Diagnosis Date    Anxiety disorder     Iron deficiency anemia     Vitamin D deficiency      Surgical History:   Past Surgical History:   Procedure Laterality Date    BILATERAL TUBAL LIGATION       SECTION       Family History:   Family History   Problem Relation Name Age of Onset    Hypertension Father       Social History:   Social History     Socioeconomic History    Marital status: Single   Tobacco Use    Smoking status: Never    Smokeless tobacco: Never   Substance and Sexual Activity    Alcohol use: Not Currently    Drug use: Never    Sexual activity: Yes     Partners: Male     Review of patient's allergies indicates:  No Known Allergies    Medications:   Facility-Administered Medications Prior to Admission   Medication Dose Route Frequency Provider Last Rate Last Admin    ferric gluconate (FERRLECIT) 125 mg in sodium chloride 0.9% 100 mL IVPB  125 mg Intravenous 1 time in Clinic/HOD Order, Paper        heparin, porcine (PF) 100 unit/mL injection flush 500 Units   500 Units Intravenous PRN Order, Paper        heparin, porcine (PF) 100 unit/mL injection flush 500 Units  500 Units Intravenous PRN Order, Paper        heparin, porcine (PF) 100 unit/mL injection flush 500 Units  500 Units Intravenous PRN Order, Paper        heparin, porcine (PF) 100 unit/mL injection flush 500 Units  500 Units Intravenous PRN Order, Paper        heparin, porcine (PF) 100 unit/mL injection flush 500 Units  500 Units Intravenous PRN Order, Paper        heparin, porcine (PF) 100 unit/mL injection flush 500 Units  500 Units Intravenous PRN Order, Paper        heparin, porcine (PF) 100 unit/mL injection flush 500 Units  500 Units Intravenous PRN Order, Paper        heparin, porcine (PF) 100 unit/mL injection flush 500 Units  500 Units Intravenous PRN Order, Paper        sodium chloride 0.9% 100 mL flush bag   Intravenous PRN Order, Paper        sodium chloride 0.9% 100 mL flush bag   Intravenous PRN Order, Paper        sodium chloride 0.9% 100 mL flush bag   Intravenous PRN Order, Paper        sodium chloride 0.9% 100 mL flush bag   Intravenous PRN Order, Paper        sodium chloride 0.9% 100 mL flush bag   Intravenous PRN Order, Paper        sodium chloride 0.9% 100 mL flush bag   Intravenous PRN Order, Paper        sodium chloride 0.9% 100 mL flush bag   Intravenous PRN Order, Paper        sodium chloride 0.9% 100 mL flush bag   Intravenous PRN Order, Paper        sodium chloride 0.9% flush 10 mL  10 mL Intravenous PRN Order, Paper   20 mL at 08/25/22 1008    sodium chloride 0.9% flush 10 mL  10 mL Intravenous PRN Order, Paper   10 mL at 09/06/22 0935    sodium chloride 0.9% flush 10 mL  10 mL Intravenous PRN Order, Paper   20 mL at 09/06/22 1046    sodium chloride 0.9% flush 10 mL  10 mL Intravenous PRN Order, Paper   30 mL at 09/27/22 1045    sodium chloride 0.9% flush 10 mL  10 mL Intravenous PRN Order, Paper   20 mL at 09/13/22 1030    sodium chloride 0.9% flush 10 mL  10 mL Intravenous PRN  "Order, Paper   20 mL at 09/15/22 0958    sodium chloride 0.9% flush 10 mL  10 mL Intravenous PRN Order, Paper   30 mL at 09/20/22 1114    sodium chloride 0.9% flush 10 mL  10 mL Intravenous PRN Order, Paper   30 mL at 09/22/22 1036     Medications Prior to Admission   Medication Sig Dispense Refill Last Dose    polyethylene glycol (MOVIPREP) 100-7.5-2.691 gram solution Take as directed prior to colonoscopy 1 kit 0 7/19/2024    docusate sodium (COLACE) 100 MG capsule Take 1 capsule (100 mg total) by mouth 2 (two) times daily as needed for Constipation. 20 capsule 0 More than a month     Physical Exam:  Vital Signs:   Vitals:    07/19/24 0640   BP: 133/81     /81   LMP 07/19/2024 (Exact Date)   Breastfeeding No     General: NAD  Lungs: NWOB on RA. Symmetric chest rise and fall  Heart: RR  Abdomen: Soft, NT, ND; non-tender, non-pulsatile mass in the RUQ    Labs:  Lab Results   Component Value Date    WBC 4.87 04/15/2024    HGB 6.9 (L) 04/15/2024    HCT 26.4 (L) 04/15/2024    MCV 69.7 (L) 04/15/2024     04/15/2024     No results found for: "INR", "PT", "APTT"  Lab Results   Component Value Date     04/15/2024    K 3.4 (L) 04/15/2024    CO2 22 04/15/2024    BUN 4.5 (L) 04/15/2024    CREATININE 0.72 04/15/2024    LABPROT 7.2 04/15/2024    ALBUMIN 3.8 04/15/2024    BILITOT 0.9 04/15/2024    ALKPHOS 142 04/15/2024    ALT 34 04/15/2024    AST 25 04/15/2024     Assessment and Plan:   Patient is a 49F who presents today for screening colonoscopy.     - After discussing risks, benefits, and alternatives, patient elects to proceed with colonoscopy, and any other indicated procedures. All questions and concerns addressed. Written and verbal consent obtained.     Christin Ramon, DO  [employer] General Surgery, PGY2  "

## 2024-07-19 NOTE — PLAN OF CARE
Pt completed pregnancy test on arrival at 0640am. Pregnancy test resulted at 0645am. Pregnancy test was negative.

## 2024-07-19 NOTE — DISCHARGE SUMMARY
[facility]  General Surgery  Discharge Summary      Patient Name: Vanesa Whitlock  MRN: [id number]  Admission Date: 7/19/2024  Hospital Length of Stay: 0 days  Discharge Date and Time:  07/19/2024 8:03 AM  Attending Physician: BRIANNA Price MD   Discharging Provider: Christin Ramon MD  Primary Care Provider: Sabrina, Primary Doctor     HPI/Hospital Course: Patient is a 49F with history of unspecified chronic anemia who presented today for colonoscopy. She underwent this procedure without any immediate complications. See procedure note for further details. After procedure, she wasn't have any pain and was taking PO intake without issue. At that time, deemed stable for discharge with return precautions. Discussed that given this RUQ mass with no apparent source found during colonoscopy, have recommended she obtain a CT abdomen/pelvis for further workup. Follow up with PCP for results. She verbalized understanding.     Procedure(s) (LRB):  COLONOSCOPY (N/A)     Final Active Diagnoses:    Diagnosis Date Noted POA    Screening for colon cancer [Z12.11] 04/16/2024 Not Applicable      Problems Resolved During this Admission:      Discharged Condition: good    Disposition: Home or Self Care    Follow Up: with PCP for imaging results    Patient Instructions:      CT Abdomen Pelvis W Wo Contrast   Standing Status: Future Standing Exp. Date: 07/19/25     Order Specific Question Answer Comments   Does this patient have impaired renal function? No    Does the patient have high blood pressure requiring medical treatment? No    Diabetes? No    May the Radiologist modify the order per protocol to meet the clinical needs of the patient? Yes    Oral/Rectal Contrast instructions: NO Oral Contrast    Special CT ABD Protocol Request? Routine      Diet Adult Regular     Notify your health care provider if you experience any of the following:  temperature >100.4     Notify your health care provider if you experience any  of the following:  persistent nausea and vomiting or diarrhea     Notify your health care provider if you experience any of the following:  severe uncontrolled pain     Notify your health care provider if you experience any of the following:  difficulty breathing or increased cough     Activity as tolerated     Medications:  Reconciled Home Medications:      Medication List        CONTINUE taking these medications      docusate sodium 100 MG capsule  Commonly known as: COLACE  Take 1 capsule (100 mg total) by mouth 2 (two) times daily as needed for Constipation.     polyethylene glycol 100-7.5-2.691 gram solution  Commonly known as: MoviPrep  Take as directed prior to colonoscopy            Christin Ramon DO  [employer] General Surgery PGY-2  [facility]

## 2024-07-19 NOTE — PLAN OF CARE
Patient drowsy but answers with nods of head yes and no to questions. Family at bedside. Call bell in reach.

## 2024-07-19 NOTE — PLAN OF CARE
Assisted patient up to dress. Family to help patient dress. Patient requesting to speak to Dr. Price now that she is more awake. Otherwise ready for discharge.

## 2024-07-19 NOTE — TRANSFER OF CARE
"Anesthesia Transfer of Care Note    Patient: Vanesa Whitlock    Procedure(s) Performed: Procedure(s) (LRB):  COLONOSCOPY (N/A)    Patient location: GI    Anesthesia Type: general    Transport from OR: Transported from OR on room air with adequate spontaneous ventilation    Post pain: adequate analgesia    Post assessment: no apparent anesthetic complications    Post vital signs: stable    Level of consciousness: awake    Nausea/Vomiting: no nausea/vomiting    Complications: none    Transfer of care protocol was followed      Last vitals: Visit Vitals  /81 (BP Location: Left arm, Patient Position: Lying)   Pulse 64   Temp 36.6 °C (97.9 °F) (Oral)   Resp 15   Ht 5' 1" (1.549 m)   Wt 64.8 kg (142 lb 12.8 oz)   LMP 07/19/2024 (Exact Date)   SpO2 100%   Breastfeeding No   BMI 26.98 kg/m²     "

## 2024-08-05 ENCOUNTER — HOSPITAL ENCOUNTER (OUTPATIENT)
Dept: RADIOLOGY | Facility: HOSPITAL | Age: 49
Discharge: HOME | End: 2024-08-05
Payer: COMMERCIAL

## 2024-08-05 DIAGNOSIS — Z12.11 SCREENING FOR COLON CANCER: ICD-10-CM

## 2024-08-05 DIAGNOSIS — D50.9 IRON DEFICIENCY ANEMIA, UNSPECIFIED IRON DEFICIENCY ANEMIA TYPE: ICD-10-CM

## 2024-08-05 PROCEDURE — 25500020 PHARM REV CODE 255

## 2024-08-05 PROCEDURE — 74178 CT ABD&PLV WO CNTR FLWD CNTR: CPT | Mod: TC

## 2024-08-05 RX ADMIN — IOHEXOL 100 ML: 350 INJECTION, SOLUTION INTRAVENOUS at 04:08

## 2024-08-26 ENCOUNTER — OFFICE VISIT (OUTPATIENT)
Dept: GYNECOLOGY | Facility: CLINIC | Age: 49
End: 2024-08-26
Payer: OTHER GOVERNMENT

## 2024-08-26 ENCOUNTER — LAB VISIT (OUTPATIENT)
Dept: LAB | Facility: HOSPITAL | Age: 49
End: 2024-08-26
Payer: COMMERCIAL

## 2024-08-26 VITALS
TEMPERATURE: 98 F | SYSTOLIC BLOOD PRESSURE: 135 MMHG | HEIGHT: 61 IN | DIASTOLIC BLOOD PRESSURE: 84 MMHG | OXYGEN SATURATION: 100 % | BODY MASS INDEX: 27.79 KG/M2 | HEART RATE: 69 BPM | WEIGHT: 147.19 LBS

## 2024-08-26 DIAGNOSIS — D64.9 SYMPTOMATIC ANEMIA: ICD-10-CM

## 2024-08-26 DIAGNOSIS — D21.9 FIBROID: Primary | ICD-10-CM

## 2024-08-26 DIAGNOSIS — N92.0 MENORRHAGIA WITH REGULAR CYCLE: ICD-10-CM

## 2024-08-26 LAB
B-HCG UR QL: NEGATIVE
BASOPHILS # BLD AUTO: 0.04 X10(3)/MCL
BASOPHILS NFR BLD AUTO: 1 %
C TRACH DNA SPEC QL NAA+PROBE: NOT DETECTED
CLUE CELLS VAG QL WET PREP: ABNORMAL
CTP QC/QA: YES
EOSINOPHIL # BLD AUTO: 0.43 X10(3)/MCL (ref 0–0.9)
EOSINOPHIL NFR BLD AUTO: 10.4 %
ERYTHROCYTE [DISTWIDTH] IN BLOOD BY AUTOMATED COUNT: 14.6 % (ref 11.5–17)
FERRITIN SERPL-MCNC: 24.83 NG/ML (ref 4.63–204)
HCT VFR BLD AUTO: 37.9 % (ref 37–47)
HGB BLD-MCNC: 12.1 G/DL (ref 12–16)
IMM GRANULOCYTES # BLD AUTO: 0.01 X10(3)/MCL (ref 0–0.04)
IMM GRANULOCYTES NFR BLD AUTO: 0.2 %
IRON SATN MFR SERPL: 13 % (ref 20–50)
IRON SERPL-MCNC: 49 UG/DL (ref 50–170)
LYMPHOCYTES # BLD AUTO: 1.64 X10(3)/MCL (ref 0.6–4.6)
LYMPHOCYTES NFR BLD AUTO: 39.6 %
MCH RBC QN AUTO: 28.3 PG (ref 27–31)
MCHC RBC AUTO-ENTMCNC: 31.9 G/DL (ref 33–36)
MCV RBC AUTO: 88.8 FL (ref 80–94)
MONOCYTES # BLD AUTO: 0.37 X10(3)/MCL (ref 0.1–1.3)
MONOCYTES NFR BLD AUTO: 8.9 %
N GONORRHOEA DNA SPEC QL NAA+PROBE: NOT DETECTED
NEUTROPHILS # BLD AUTO: 1.65 X10(3)/MCL (ref 2.1–9.2)
NEUTROPHILS NFR BLD AUTO: 39.9 %
NRBC BLD AUTO-RTO: 0 %
PLATELET # BLD AUTO: 282 X10(3)/MCL (ref 130–400)
PMV BLD AUTO: 9.4 FL (ref 7.4–10.4)
RBC # BLD AUTO: 4.27 X10(6)/MCL (ref 4.2–5.4)
SOURCE (OHS): NORMAL
T VAGINALIS VAG QL WET PREP: ABNORMAL
TIBC SERPL-MCNC: 315 UG/DL (ref 70–310)
TIBC SERPL-MCNC: 364 UG/DL (ref 250–450)
TRANSFERRIN SERPL-MCNC: 350 MG/DL (ref 180–382)
WBC # BLD AUTO: 4.14 X10(3)/MCL (ref 4.5–11.5)
WBC #/AREA VAG WET PREP: ABNORMAL
YEAST SPEC QL WET PREP: ABNORMAL

## 2024-08-26 PROCEDURE — 88174 CYTOPATH C/V AUTO IN FLUID: CPT

## 2024-08-26 PROCEDURE — 83540 ASSAY OF IRON: CPT

## 2024-08-26 PROCEDURE — 81025 URINE PREGNANCY TEST: CPT | Mod: PBBFAC

## 2024-08-26 PROCEDURE — 58100 BIOPSY OF UTERUS LINING: CPT | Mod: PBBFAC | Performed by: OBSTETRICS & GYNECOLOGY

## 2024-08-26 PROCEDURE — 85025 COMPLETE CBC W/AUTO DIFF WBC: CPT

## 2024-08-26 PROCEDURE — 87491 CHLMYD TRACH DNA AMP PROBE: CPT

## 2024-08-26 PROCEDURE — 87210 SMEAR WET MOUNT SALINE/INK: CPT

## 2024-08-26 PROCEDURE — 87591 N.GONORRHOEAE DNA AMP PROB: CPT

## 2024-08-26 PROCEDURE — 82728 ASSAY OF FERRITIN: CPT

## 2024-08-26 PROCEDURE — 99213 OFFICE O/P EST LOW 20 MIN: CPT | Mod: PBBFAC

## 2024-08-26 PROCEDURE — 36415 COLL VENOUS BLD VENIPUNCTURE: CPT

## 2024-08-26 PROCEDURE — 88305 TISSUE EXAM BY PATHOLOGIST: CPT | Mod: TC

## 2024-08-26 PROCEDURE — 87624 HPV HI-RISK TYP POOLED RSLT: CPT

## 2024-08-26 RX ORDER — ACETAMINOPHEN 160 MG/5ML
SUSPENSION ORAL
COMMUNITY

## 2024-08-26 NOTE — PROGRESS NOTES
"[facility] GYNECOLOGY CLINIC NOTE     Vanesa Whitlock is a 49 y.o.  presenting to GYN clinic for menorrhagia and irregular cycles. Patient has hx of lower pelvic pain which has been present since her first period. Cycles have recently become progressively irregular for the last several months. Most recent bleed from May to July for approx 6-8 wks. Required hourly tampon changes secondary to tampons being saturated and passing clots. Seen in ED 2024 for heavy menstrual bleeding resulting in symptomatic anemia requiring 1U pRBC transfusion. Patient received depo shot after presenting to ER for anemia requiring a transfusion, but did not decrease bleeding. Patient's LMP was 24 and lasted 5 days. Patient had abdominal CT scan on 24 showing enlarged uterus and too many fibroids to count. Reports has noticed increasing weight gain and abdominal fullness in last 6 months. Also experiencing constipation with bright red blood. Last colonoscopy 2024 with no abnormal findings. Patient denies abnormal discharge, vulvar rash or skin changes, dysuria, dyspareunia. Not currently sexually active and no histories of STIs. Patient states she did require a cervical biopsy 3 years ago 2/2 abnormal PAP smear. Last PAP 2 years ago unremarkable. Patient states her maternal aunt was a "free bleeder", but has no known hx of bleeding disorder. Patient has hx of 3 c-sections and tubal ligation. Hx of breast cancer in grandmother at 93, denies hx of colon, uterine, or ovarian cancer.     Gynecology  OB History          3    Para   3    Term                AB        Living             SAB        IAB        Ectopic        Multiple        Live Births                    Past Medical History:   Diagnosis Date    Anxiety disorder     Iron deficiency anemia     Vitamin D deficiency       Past Surgical History:   Procedure Laterality Date    BILATERAL TUBAL LIGATION       SECTION      COLONOSCOPY N/A 2024    " "Procedure: COLONOSCOPY;  Surgeon: BRIANNA Price MD;  Location: [facility] ENDOSCOPY;  Service: Gastroenterology;  Laterality: N/A;      Current Outpatient Medications   Medication Instructions    acetaminophen (TYLENOL) 160 mg/5 mL (5 mL) Susp Oral    docusate sodium (COLACE) 100 mg, Oral, 2 times daily PRN    polyethylene glycol (MOVIPREP) 100-7.5-2.691 gram solution Take as directed prior to colonoscopy     Social History     Tobacco Use    Smoking status: Never    Smokeless tobacco: Never   Substance Use Topics    Alcohol use: Not Currently    Drug use: Never       Review of Systems  Pertinent items noted in HPI.    Objective:     Vitals:    08/26/24 0908 08/26/24 0912   BP: (!) 152/95 135/84   BP Location: Right arm Left arm   Pulse: 69    Temp: 98.4 °F (36.9 °C)    SpO2: 100%    Weight: 66.8 kg (147 lb 3.2 oz)    Height: 5' 1" (1.549 m)      Body mass index is 27.81 kg/m².    Physical Exam:   General: Alert and oriented, in no acute distress  Lungs: Breathing comfortably on room air, no conversational dyspnea  Heart: Regular rate, extremities well perfused  Abdomen: Soft, non-distended, diffuse tenderness to palpation, no involuntary guarding, no rebound tenderness, fundus palpated to approx 5 cm above umbilicus  Extremities: Atraumatic, non-edematous, no cords or calf tenderness, no significant calf/ankle edema  External genitalia: Normal female genitalia without lesion, discharge or tenderness. Normal appearing urethral meatus. Normal appearing external anus.  Bimanual Exam: Uterus 11 cm in size, anteverted, freely mobile, smooth in contour, no masses. No cervical motion tenderness. No adnexal fullness/tenderness.  Speculum Exam: Vaginal mucosa pink and moist, without lesion. Scant, white discharge present in vaginal canal. Cervix well visualized, smooth in contour with no masses or lesions. Os normal in appearance without blood or discharge.   Note:  Female nurse chaperone present for entirety of " exam.    Relevant Labs:   Lab Results   Component Value Date    WBC 4.87 04/15/2024    HGB 6.9 (L) 04/15/2024    HCT 26.4 (L) 04/15/2024    MCV 69.7 (L) 04/15/2024     04/15/2024         Relevant Imaging:  Abdominal CT scan on 24 showing enlarged uterus and too many fibroids to count      Assessment:       49 y.o.  here for AUB. Patient's bled from May to July requiring a tampon every hour. Patient's cycles have been progressively getting more irregular. Abdominal CT shows uterine enlargement with too many fibroids to count. Pap smear and EMB obtained.       1. Menorrhagia with regular cycle  Ambulatory referral/consult to Obstetrics / Gynecology    POCT Urine Pregnancy    Chlamydia/GC, PCR    Liquid-Based Pap Smear, Screening    Specimen to Pathology Gynecology and Obstetrics      2. Symptomatic anemia  Ambulatory referral/consult to Obstetrics / Gynecology    CBC Auto Differential    Iron and TIBC    Ferritin             Plan:   AUB  - CT abdomen shows presence of large fibroids  - Pap, GC/chlamydia, wet prep, and EMB today to r/o other possible etiologies for AUB, will call patient and review results  - Patient expresses desire for surgical vs medical intervention at this time given symptomatology. Will have patient return to clinic to discuss surgical options based on results    Symptomatic Anemia  - S/p 1U pRBC transfusion in ED on 4/15/2024  - Patient has not had repeat testing since that time, will order CBC and iron panel  - Previously prescribed daily iron supplementation but has not been taking 2/2 to associated constipation  - Discussed with patient importance of iron supplementation. May transition to every other day on an empty stomach with orange juice, also encouraged increased dietary intake of iron rich foods such as red meat and seafood    Problem List Items Addressed This Visit    None  Visit Diagnoses       Menorrhagia with regular cycle        Relevant Orders    POCT Urine  Pregnancy (Completed)    Chlamydia/GC, PCR    Liquid-Based Pap Smear, Screening    Specimen to Pathology Gynecology and Obstetrics    Symptomatic anemia        Relevant Orders    CBC Auto Differential    Iron and TIBC    Ferritin            Telehealth visit in 2 weeks to discuss results    Discussed patient and plan with Dr. Mary Lou Chen, [employer] [facility]      Mk Sparks MD  [facility] -[facility]

## 2024-08-28 LAB
HIGH RISK HPV: NEGATIVE
PSYCHE PATHOLOGY RESULT: NORMAL

## 2024-08-28 NOTE — PROCEDURES
Endometrial biopsy    Date/Time: 8/26/2024 8:00 AM    Performed by: Craig Barreto MD  Authorized by: Craig Barreto MD    Consent:     Consent obtained:  Prior to procedure the appropriate consent was completed and verified    Consent given by:  Patient    Patient questions answered: yes      Patient agrees, verbalizes understanding, and wants to proceed: yes      Educational handouts given: yes      Instructions and paperwork completed: yes    Indication:     Indications: Menorrhagia    Pre-procedure:     Pre-procedure timeout performed: yes    Procedure:     Procedure: endometrial biopsy with Pipelle      Cervix cleaned and prepped: yes      A paracervical block was performed: no      An intracervical block was performed: no      The cervix was dilated: no      Uterus sounded: yes (11 cm)      Uterus sound depth (cm):  11    Curettes used:  1    Specimen collected: specimen collected and sent to pathology      Specimen collected comment:  Minimal tissue return after two passes performed  Comments:     Procedure comments:  Patient's LMP was 1 week ago.

## 2024-08-29 LAB
ESTROGEN SERPL-MCNC: NORMAL PG/ML
INSULIN SERPL-ACNC: NORMAL U[IU]/ML
LAB AP CLINICAL INFORMATION: NORMAL
LAB AP GROSS DESCRIPTION: NORMAL
LAB AP REPORT FOOTNOTES: NORMAL

## 2024-09-03 ENCOUNTER — TELEPHONE (OUTPATIENT)
Dept: FAMILY MEDICINE | Facility: CLINIC | Age: 49
End: 2024-09-03
Payer: OTHER GOVERNMENT

## 2024-09-03 ENCOUNTER — TELEPHONE (OUTPATIENT)
Dept: GYNECOLOGY | Facility: CLINIC | Age: 49
End: 2024-09-03
Payer: OTHER GOVERNMENT

## 2024-09-03 DIAGNOSIS — D21.9 FIBROID: ICD-10-CM

## 2024-09-03 DIAGNOSIS — D64.9 SYMPTOMATIC ANEMIA: ICD-10-CM

## 2024-09-03 DIAGNOSIS — N93.9 ABNORMAL UTERINE BLEEDING (AUB): Primary | ICD-10-CM

## 2024-09-03 NOTE — TELEPHONE ENCOUNTER
Called and spoke to patient. I have reviewed her CBC, iron panel, ferritin lab results and endometrial biopsy from 8/26/2024. All lab results are unremarkable. H/H improved to 12.1/37.9 from 6.9/26.4. Iron panel showed patient no longer severely iron deficient. Endometrial biopsy scant strips of endometrial epithelium and minimal stroma. Will have patient return to clinic to discuss possible hysterectomy.    Mk Sparks MD  [facility] HO-II

## 2024-09-03 NOTE — TELEPHONE ENCOUNTER
Physician contacted patient to discuss surgical planning.   Desires surgery date 10/3/2024.  Will place case request.   To pre-op clinic.     Mica Kimbrough,   [employer] OB-[facility] PGY-3

## 2024-09-15 NOTE — PROGRESS NOTES
[facility] Preoperative Visit History and Physical    HPI:   49 y.o.  with AUB-L, here for pre-operative visit for ALYSSA/BS.    Patient reports significant AUB, worsening over the past year. Reports that menses used to be regular. Recently, menses have been longer, unpredictable,. Will have 1-2 week without bleeding, otherwise persistently having bleeding or spotting. Significant bleeding when heavy, saturating sanitary products hourly and bleeding through to her clothing. AUB has resulted in symptomatic anemia requiring blood transfusion 2024. Patient has tried Depo for menstrual control without improvement in bleeding profile. She also reports bulk symptoms including constipation, BM every 3 days approx- worsened in the last year as well. Reports some urge incontinence.    For her workup, CT A/P was obtained demonstrating 14 cm, large fibroids uterus with innumerable leiomyomata. EMB was performed given her age and recently irregular menses revealing scant endometrial epithelium insufficient for diagnosis. Patient desires definitive surgical management at this time.      Past Medical History:   Diagnosis Date    Anxiety disorder     Iron deficiency anemia     Migraine headache with aura     Vitamin D deficiency      Past Surgical History:   Procedure Laterality Date    BILATERAL TUBAL LIGATION      at time of CS#3     SECTION      COLONOSCOPY N/A 2024    Procedure: COLONOSCOPY;  Surgeon: BRIANNA Price MD;  Location: [facility] ENDOSCOPY;  Service: Gastroenterology;  Laterality: N/A;     Current Outpatient Medications   Medication Instructions    acetaminophen (TYLENOL) 160 mg/5 mL (5 mL) Susp Oral    butalbital-acetaminophen-caffeine -40 mg (FIORICET, ESGIC) -40 mg per tablet 1 tablet, Oral, Every 4 hours PRN     OB History    Para Term  AB Living   3 3           SAB IAB Ectopic Multiple Live Births                  # Outcome Date GA Lbr Oziel/2nd Weight Sex Type Anes  "PTL Lv   3 Para      CS-Unspec      2 Para      CS-Unspec      1 Para      CS-Unspec        GynHx:  LMP: Started bleeding in May and has been bleeding persistently since- fluctuates between spotting and heavy bleeding.   Triad: Prior to the last year 10/R/4-5  Contraception: BTL  Pap Hx: Denies hx of abnormal pap smears. Most recent NILM, HPV negative 08/2024. Noted in a prior note that she had colposcopy done, but patient denies this.   STI Hx: Denies hx of STIs.     Family History   Problem Relation Name Age of Onset    Hypertension Father      Breast cancer Maternal Grandmother  93    Colon cancer Neg Hx      Ovarian cancer Neg Hx      Uterine cancer Neg Hx       Social History     Socioeconomic History    Marital status: Single   Tobacco Use    Smoking status: Never    Smokeless tobacco: Never   Substance and Sexual Activity    Alcohol use: Not Currently    Drug use: Never    Sexual activity: Yes     Partners: Male     Birth control/protection: See Surgical Hx     Comment: HX DAXA     Lives with her daughter and son.   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    Review of patient's allergies indicates:   Allergen Reactions    Phenergan plain      Drowsiness    Primaquine      Patient not aware of this allergy that is listed     Review of Systems: As stated in HPI.      Objective:     Vitals:    09/18/24 1520   BP: 126/70   Pulse: 72   Resp: 12   Temp: 98 °F (36.7 °C)   SpO2: 100%   Weight: 67 kg (147 lb 9.6 oz)   Height: 5' 1" (1.549 m)     Body mass index is 27.89 kg/m².    PHYSICAL EXAM  GEN: NAD, A&O x3  CV: RRR   LUNGS: CTABL  ABDOMEN: soft, NTND, no masses  INCISIONS: Well-healed pfannenstiel   VAGINA: Moist, no lesions or masses  VULVA: Normal external genitalia  CERVIX: Small, well visualized without any masses or lesions. Very anterior and tucked behind pubic bone. No CMT. Os normal in appearance without bleeding or discharge.   UTERUS: 25 cm in size, fundus " palpable approx 10 cm above umbilicus. Minimal mobility, no descent, broad (approx 10-12 cm in diameter on external palpation).   ADNEXA: No fullness, masses or tenderness    LABS:    Lab Results   Component Value Date    WBC 4.14 (L) 2024    HGB 12.1 2024    HCT 37.9 2024    MCV 88.8 2024     2024     Lab Results   Component Value Date    UIBC 315 (H) 2024    IRON 49 (L) 2024    TRANSFERRIN 350 2024    TIBC 364 2024    LABIRON 13 (L) 2024      TSH 2024: WNL    Pap 2024: NILM, HPV negative    EMB: Scant strips of endometrial epithelium and minimal stroma.  Insufficient tissue for further diagnostic evaluation.      Colonoscopy 2024: Negative, repeat in 7-10 yrs     IMAGING:    CT A/P 2024:     FINDINGS:  The lung bases are clear. The liver appears normal.  No liver mass or lesion is seen.  Portal and hepatic veins appear normal. The gallbladder appears grossly unremarkable. The pancreas appears normal.  No pancreatic mass or lesion is seen. The spleen appears normal.  No splenic mass or lesion is seen. The adrenal glands appear normal.  No adrenal nodule is seen. The kidneys are normal in size.  No hydronephrosis is seen.  No hydroureter is seen.  No nephrolithiasis or ureteral stone is seen.  No cortical abnormality is seen. There is a markedly enlarged uterus with multiple too numerous to count uterine fibroids.  The uterus measures 14 cm x 8.5 cm by 16 cm. Urinary bladder appears normal. No colitis is seen.  No diverticulitis is seen.  No colonic mass or lesion or inflammatory process is seen. No free air is seen.  No free fluid is seen. The bones appear grossly unremarkable.     Impression:  One hundred twenty enlarged uterus with multiple uterine fibroids    Assessment:      49 y.o.  with AUB-L, bulk symptoms, desiring definitive surgical management.    Problem List Items Addressed This Visit    None  Visit Diagnoses        Abnormal uterine bleeding (AUB)    -  Primary    Relevant Orders    CBC Without Differential    Type & Screen    Fibroid        Preop examination              Plan:     Counseling: Alternatives to this planned procedure were explained to the patient including expectant, medical and other types of surgical management. This procedure and its risks, reasons, benefits and complications (including injury to bowel, bladder, major blood vessel, ureter, bleeding, possibility of transfusion, infection, scarring, dyspareunia, erosion, further surgery, worsening incontinence, failure of the procedure or fistula formation) were reviewed in detail.    We have reviewed the typical perioperative course with hospital stay, and post-operative precautions and restrictions have been reviewed.    Additionally, ovarian conservation versus elective risk reducing ovarian resection were discussed with the patient.  She understands the risk for reoperation for ovarian etiology to be 5-10%, the risk for ovarian cancer in women to be 1 in 70, and the protective effects of ovarian hormones including cardiovascular and bone health.  After discussion, the patient desires ovarian conservation with the understanding that if any disease is suspected or bleeding that necessitates it - they may be removed.   Pt counseled on the risks related to occult Leiomyosarcoma (1 in 770 to 1 in 10,000 based on recent ACOG publication) and the possibility to need morcellation given size of uterus. Pt verbalized understanding.   Patient counseled on the fact that cystotomy complicates approximately 0.3 to 11/1000 benign gynecologic surgeries, especially urogynecologic procedures and hysterectomy.  Patient is aware that, in the event of cystotomy, continuous bladder drainage will be continued for 7-10 days with Terry catheter in place.   Counseled on ureteral injury rates of 0.2-7.3%, bowel injury rates of <1%.   Transfusion of blood and blood products discussed.  Patient was consented for blood transfusion in the case of an emergency.  She understands the possibility of blood transfusion reaction and the attendant risk of transmission of HIV & Hepatitis C to be 1 in 2 million and the risk of Hepatitis B to be 1 in 200,000.  She is aware of the possibility of transfusion reaction. All questions were answered.   Patient understands we are affiliated with a teaching institution and residents and medical students will be involved in her care.  She has consented to an exam under anesthesia and understands that medical students participate in this portion of the procedure.  All questions were answered.    Preop testing ordered. Surgical consents signed.  Instructions reviewed, including NPO after midnight.   Counseled to hold the following medications on the day of surgery: per anesthesia  Current contraception: BTL    To OR for ALYSSA/BS/Cysto with Dr. Cooper.   Scheduled on 10/3/2024.     Discussed with Dr. Cooper.     Mica Kimbrough,   [facility] PGY-3

## 2024-09-15 NOTE — H&P (VIEW-ONLY)
[facility] Preoperative Visit History and Physical    HPI:   49 y.o.  with AUB-L, here for pre-operative visit for ALYSSA/BS.    Patient reports significant AUB, worsening over the past year. Reports that menses used to be regular. Recently, menses have been longer, unpredictable,. Will have 1-2 week without bleeding, otherwise persistently having bleeding or spotting. Significant bleeding when heavy, saturating sanitary products hourly and bleeding through to her clothing. AUB has resulted in symptomatic anemia requiring blood transfusion 2024. Patient has tried Depo for menstrual control without improvement in bleeding profile. She also reports bulk symptoms including constipation, BM every 3 days approx- worsened in the last year as well. Reports some urge incontinence.    For her workup, CT A/P was obtained demonstrating 14 cm, large fibroids uterus with innumerable leiomyomata. EMB was performed given her age and recently irregular menses revealing scant endometrial epithelium insufficient for diagnosis. Patient desires definitive surgical management at this time.      Past Medical History:   Diagnosis Date    Anxiety disorder     Iron deficiency anemia     Migraine headache with aura     Vitamin D deficiency      Past Surgical History:   Procedure Laterality Date    BILATERAL TUBAL LIGATION      at time of CS#3     SECTION      COLONOSCOPY N/A 2024    Procedure: COLONOSCOPY;  Surgeon: BRIANNA Price MD;  Location: [facility] ENDOSCOPY;  Service: Gastroenterology;  Laterality: N/A;     Current Outpatient Medications   Medication Instructions    acetaminophen (TYLENOL) 160 mg/5 mL (5 mL) Susp Oral    butalbital-acetaminophen-caffeine -40 mg (FIORICET, ESGIC) -40 mg per tablet 1 tablet, Oral, Every 4 hours PRN     OB History    Para Term  AB Living   3 3           SAB IAB Ectopic Multiple Live Births                  # Outcome Date GA Lbr Oziel/2nd Weight Sex Type Anes  "PTL Lv   3 Para      CS-Unspec      2 Para      CS-Unspec      1 Para      CS-Unspec        GynHx:  LMP: Started bleeding in May and has been bleeding persistently since- fluctuates between spotting and heavy bleeding.   Triad: Prior to the last year 10/R/4-5  Contraception: BTL  Pap Hx: Denies hx of abnormal pap smears. Most recent NILM, HPV negative 08/2024. Noted in a prior note that she had colposcopy done, but patient denies this.   STI Hx: Denies hx of STIs.     Family History   Problem Relation Name Age of Onset    Hypertension Father      Breast cancer Maternal Grandmother  93    Colon cancer Neg Hx      Ovarian cancer Neg Hx      Uterine cancer Neg Hx       Social History     Socioeconomic History    Marital status: Single   Tobacco Use    Smoking status: Never    Smokeless tobacco: Never   Substance and Sexual Activity    Alcohol use: Not Currently    Drug use: Never    Sexual activity: Yes     Partners: Male     Birth control/protection: See Surgical Hx     Comment: HX DAXA     Lives with her daughter and son.   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    Review of patient's allergies indicates:   Allergen Reactions    Phenergan plain      Drowsiness    Primaquine      Patient not aware of this allergy that is listed     Review of Systems: As stated in HPI.      Objective:     Vitals:    09/18/24 1520   BP: 126/70   Pulse: 72   Resp: 12   Temp: 98 °F (36.7 °C)   SpO2: 100%   Weight: 67 kg (147 lb 9.6 oz)   Height: 5' 1" (1.549 m)     Body mass index is 27.89 kg/m².    PHYSICAL EXAM  GEN: NAD, A&O x3  CV: RRR   LUNGS: CTABL  ABDOMEN: soft, NTND, no masses  INCISIONS: Well-healed pfannenstiel   VAGINA: Moist, no lesions or masses  VULVA: Normal external genitalia  CERVIX: Small, well visualized without any masses or lesions. Very anterior and tucked behind pubic bone. No CMT. Os normal in appearance without bleeding or discharge.   UTERUS: 25 cm in size, fundus " palpable approx 10 cm above umbilicus. Minimal mobility, no descent, broad (approx 10-12 cm in diameter on external palpation).   ADNEXA: No fullness, masses or tenderness    LABS:    Lab Results   Component Value Date    WBC 4.14 (L) 2024    HGB 12.1 2024    HCT 37.9 2024    MCV 88.8 2024     2024     Lab Results   Component Value Date    UIBC 315 (H) 2024    IRON 49 (L) 2024    TRANSFERRIN 350 2024    TIBC 364 2024    LABIRON 13 (L) 2024      TSH 2024: WNL    Pap 2024: NILM, HPV negative    EMB: Scant strips of endometrial epithelium and minimal stroma.  Insufficient tissue for further diagnostic evaluation.      Colonoscopy 2024: Negative, repeat in 7-10 yrs     IMAGING:    CT A/P 2024:     FINDINGS:  The lung bases are clear. The liver appears normal.  No liver mass or lesion is seen.  Portal and hepatic veins appear normal. The gallbladder appears grossly unremarkable. The pancreas appears normal.  No pancreatic mass or lesion is seen. The spleen appears normal.  No splenic mass or lesion is seen. The adrenal glands appear normal.  No adrenal nodule is seen. The kidneys are normal in size.  No hydronephrosis is seen.  No hydroureter is seen.  No nephrolithiasis or ureteral stone is seen.  No cortical abnormality is seen. There is a markedly enlarged uterus with multiple too numerous to count uterine fibroids.  The uterus measures 14 cm x 8.5 cm by 16 cm. Urinary bladder appears normal. No colitis is seen.  No diverticulitis is seen.  No colonic mass or lesion or inflammatory process is seen. No free air is seen.  No free fluid is seen. The bones appear grossly unremarkable.     Impression:  One hundred twenty enlarged uterus with multiple uterine fibroids    Assessment:      49 y.o.  with AUB-L, bulk symptoms, desiring definitive surgical management.    Problem List Items Addressed This Visit    None  Visit Diagnoses        Abnormal uterine bleeding (AUB)    -  Primary    Relevant Orders    CBC Without Differential    Type & Screen    Fibroid        Preop examination              Plan:     Counseling: Alternatives to this planned procedure were explained to the patient including expectant, medical and other types of surgical management. This procedure and its risks, reasons, benefits and complications (including injury to bowel, bladder, major blood vessel, ureter, bleeding, possibility of transfusion, infection, scarring, dyspareunia, erosion, further surgery, worsening incontinence, failure of the procedure or fistula formation) were reviewed in detail.    We have reviewed the typical perioperative course with hospital stay, and post-operative precautions and restrictions have been reviewed.    Additionally, ovarian conservation versus elective risk reducing ovarian resection were discussed with the patient.  She understands the risk for reoperation for ovarian etiology to be 5-10%, the risk for ovarian cancer in women to be 1 in 70, and the protective effects of ovarian hormones including cardiovascular and bone health.  After discussion, the patient desires ovarian conservation with the understanding that if any disease is suspected or bleeding that necessitates it - they may be removed.   Pt counseled on the risks related to occult Leiomyosarcoma (1 in 770 to 1 in 10,000 based on recent ACOG publication) and the possibility to need morcellation given size of uterus. Pt verbalized understanding.   Patient counseled on the fact that cystotomy complicates approximately 0.3 to 11/1000 benign gynecologic surgeries, especially urogynecologic procedures and hysterectomy.  Patient is aware that, in the event of cystotomy, continuous bladder drainage will be continued for 7-10 days with Terry catheter in place.   Counseled on ureteral injury rates of 0.2-7.3%, bowel injury rates of <1%.   Transfusion of blood and blood products discussed.  Patient was consented for blood transfusion in the case of an emergency.  She understands the possibility of blood transfusion reaction and the attendant risk of transmission of HIV & Hepatitis C to be 1 in 2 million and the risk of Hepatitis B to be 1 in 200,000.  She is aware of the possibility of transfusion reaction. All questions were answered.   Patient understands we are affiliated with a teaching institution and residents and medical students will be involved in her care.  She has consented to an exam under anesthesia and understands that medical students participate in this portion of the procedure.  All questions were answered.    Preop testing ordered. Surgical consents signed.  Instructions reviewed, including NPO after midnight.   Counseled to hold the following medications on the day of surgery: per anesthesia  Current contraception: BTL    To OR for ALYSSA/BS/Cysto with Dr. Cooper.   Scheduled on 10/3/2024.     Discussed with Dr. Cooper.     Mica Kimbrough,   [facility] PGY-3

## 2024-09-16 ENCOUNTER — ANESTHESIA EVENT (OUTPATIENT)
Dept: SURGERY | Facility: HOSPITAL | Age: 49
End: 2024-09-16
Payer: OTHER GOVERNMENT

## 2024-09-16 PROBLEM — D25.9 UTERINE LEIOMYOMA: Status: ACTIVE | Noted: 2024-09-16

## 2024-09-16 PROBLEM — D55.0 G6PD DEFICIENCY ANEMIA: Status: ACTIVE | Noted: 2024-09-16

## 2024-09-16 PROBLEM — E55.9 VITAMIN D DEFICIENCY: Status: ACTIVE | Noted: 2024-09-16

## 2024-09-16 PROBLEM — F32.2 SEVERE MAJOR DEPRESSION, SINGLE EPISODE, WITHOUT PSYCHOTIC FEATURES: Status: ACTIVE | Noted: 2024-09-16

## 2024-09-16 PROBLEM — D64.9 ANEMIA: Status: ACTIVE | Noted: 2024-09-16

## 2024-09-16 PROBLEM — E78.5 HYPERLIPIDEMIA: Status: ACTIVE | Noted: 2024-09-16

## 2024-09-16 PROBLEM — K52.9 GASTROENTERITIS: Status: ACTIVE | Noted: 2024-09-16

## 2024-09-16 PROBLEM — R73.9 HYPERGLYCEMIA: Status: ACTIVE | Noted: 2024-09-16

## 2024-09-16 NOTE — ANESTHESIA PREPROCEDURE EVALUATION
Vanesa Whitlock is a 49 y.o. female PRESENTING FOR HYSTERECTOMY, TOTAL, ABDOMINAL with a history of   -ANEMIA    Vitals:    10/03/24 0745 10/03/24 0807 10/03/24 0833 10/03/24 0847   BP: (!) 155/87  (!) 155/87 (!) 178/100   Pulse: 74   78   Resp: 16 18  20   Temp: 36.7 °C (98 °F)   36.3 °C (97.3 °F)   TempSrc: Oral   Tympanic   SpO2: 97%   (!) 89%   Weight:           -FIBROID  -ANXIETY/DEPRESSION  -H/O GE    BETA-BLOCKER: NONE    New Orders for Anesthesia: UPT neg DOS    Patient Active Problem List   Diagnosis    Iron deficiency anemia, unspecified    Screening for colon cancer    Hypochromic microcytic anemia    Anemia    G6PD deficiency anemia    Gastroenteritis    Hyperglycemia    Hyperlipidemia    Severe major depression, single episode, without psychotic features    Uterine leiomyoma    Vitamin D deficiency       Past Surgical History:   Procedure Laterality Date    BILATERAL TUBAL LIGATION       SECTION      COLONOSCOPY N/A 2024    Procedure: COLONOSCOPY;  Surgeon: BRIANNA Price MD;  Location: [facility] ENDOSCOPY;  Service: Gastroenterology;  Laterality: N/A;       Lab Results   Component Value Date    WBC 4.14 (L) 2024    HGB 12.1 2024    HCT 37.9 2024     2024       CMP  Sodium   Date Value Ref Range Status   04/15/2024 141 136 - 145 mmol/L Final     Potassium   Date Value Ref Range Status   04/15/2024 3.4 (L) 3.5 - 5.1 mmol/L Final     Chloride   Date Value Ref Range Status   04/15/2024 110 (H) 98 - 107 mmol/L Final     CO2   Date Value Ref Range Status   04/15/2024 22 22 - 29 mmol/L Final     Blood Urea Nitrogen   Date Value Ref Range Status   04/15/2024 4.5 (L) 7.0 - 18.7 mg/dL Final     Creatinine   Date Value Ref Range Status   04/15/2024 0.72 0.55 - 1.02 mg/dL Final     Calcium   Date Value Ref Range Status   04/15/2024 9.6 8.4 - 10.2 mg/dL Final     Albumin   Date Value Ref Range Status   04/15/2024 3.8 3.5 - 5.0 g/dL Final     Bilirubin Total   Date Value Ref  Range Status   04/15/2024 0.9 <=1.5 mg/dL Final     ALP   Date Value Ref Range Status   04/15/2024 142 40 - 150 unit/L Final     AST   Date Value Ref Range Status   04/15/2024 25 5 - 34 unit/L Final     ALT   Date Value Ref Range Status   04/15/2024 34 0 - 55 unit/L Final     eGFR   Date Value Ref Range Status   04/15/2024 >60 mls/min/1.73/m2 Final           Current Outpatient Medications   Medication Instructions    acetaminophen (TYLENOL) 160 mg/5 mL (5 mL) Susp Oral    docusate sodium (COLACE) 100 mg, Oral, 2 times daily PRN    polyethylene glycol (MOVIPREP) 100-7.5-2.691 gram solution Take as directed prior to colonoscopy       Past anesthesia records:     Pre-op Assessment    I have reviewed the Patient Summary Reports.     I have reviewed the Nursing Notes. I have reviewed the NPO Status.   I have reviewed the Medications.     Review of Systems  Anesthesia Hx:  No problems with previous Anesthesia   History of prior surgery of interest to airway management or planning:          Denies Family Hx of Anesthesia complications.    Denies Personal Hx of Anesthesia complications.                    Hematology/Oncology:  Hematology Normal   Oncology Normal                                   EENT/Dental:  EENT/Dental Normal           Cardiovascular:  Cardiovascular Normal                                            Pulmonary:  Pulmonary Normal                       Renal/:  Renal/ Normal                 Hepatic/GI:  Hepatic/GI Normal                 Musculoskeletal:  Musculoskeletal Normal                Neurological:  Neurology Normal                                      Endocrine:  Endocrine Normal            Dermatological:  Skin Normal    Psych:  Psychiatric Normal                    Physical Exam  General: Cooperative, Well nourished, Alert and Oriented    Airway:  Mallampati: II / III/ II  Mouth Opening: Normal  TM Distance: Normal  Tongue: Normal  Neck ROM: Normal ROM        Anesthesia Plan  Type of  Anesthesia, risks & benefits discussed:    Anesthesia Type: Gen ETT  Intra-op Monitoring Plan: Standard ASA Monitors  Post Op Pain Control Plan: multimodal analgesia and IV/PO Opioids PRN  Induction:  IV  Airway Plan: Direct  Informed Consent: Informed consent signed with the Patient and all parties understand the risks and agree with anesthesia plan.  All questions answered. Patient consented to blood products? No  ASA Score: 2  Day of Surgery Review of History & Physical: H&P Update referred to the surgeon/provider.    Ready For Surgery From Anesthesia Perspective.     .

## 2024-09-18 ENCOUNTER — OFFICE VISIT (OUTPATIENT)
Dept: GYNECOLOGY | Facility: CLINIC | Age: 49
End: 2024-09-18
Payer: OTHER GOVERNMENT

## 2024-09-18 VITALS
OXYGEN SATURATION: 100 % | HEIGHT: 61 IN | SYSTOLIC BLOOD PRESSURE: 126 MMHG | BODY MASS INDEX: 27.87 KG/M2 | HEART RATE: 72 BPM | RESPIRATION RATE: 12 BRPM | DIASTOLIC BLOOD PRESSURE: 70 MMHG | TEMPERATURE: 98 F | WEIGHT: 147.63 LBS

## 2024-09-18 DIAGNOSIS — D21.9 FIBROID: ICD-10-CM

## 2024-09-18 DIAGNOSIS — Z01.818 PREOP EXAMINATION: ICD-10-CM

## 2024-09-18 DIAGNOSIS — N93.9 ABNORMAL UTERINE BLEEDING (AUB): Primary | ICD-10-CM

## 2024-09-18 PROCEDURE — 99215 OFFICE O/P EST HI 40 MIN: CPT | Mod: PBBFAC

## 2024-09-18 RX ORDER — BUTALBITAL, ACETAMINOPHEN AND CAFFEINE 50; 325; 40 MG/1; MG/1; MG/1
1 TABLET ORAL EVERY 4 HOURS PRN
COMMUNITY

## 2024-09-30 ENCOUNTER — TELEPHONE (OUTPATIENT)
Dept: GYNECOLOGY | Facility: CLINIC | Age: 49
End: 2024-09-30
Payer: OTHER GOVERNMENT

## 2024-09-30 ENCOUNTER — LAB VISIT (OUTPATIENT)
Dept: LAB | Facility: HOSPITAL | Age: 49
End: 2024-09-30
Payer: COMMERCIAL

## 2024-09-30 DIAGNOSIS — N93.9 ABNORMAL UTERINE BLEEDING (AUB): ICD-10-CM

## 2024-09-30 LAB
ERYTHROCYTE [DISTWIDTH] IN BLOOD BY AUTOMATED COUNT: 12.4 % (ref 11.5–17)
GROUP & RH: NORMAL
HCT VFR BLD AUTO: 37.6 % (ref 37–47)
HGB BLD-MCNC: 11.9 G/DL (ref 12–16)
INDIRECT COOMBS: NORMAL
MCH RBC QN AUTO: 28 PG (ref 27–31)
MCHC RBC AUTO-ENTMCNC: 31.6 G/DL (ref 33–36)
MCV RBC AUTO: 88.5 FL (ref 80–94)
NRBC BLD AUTO-RTO: 0 %
PLATELET # BLD AUTO: 306 X10(3)/MCL (ref 130–400)
PMV BLD AUTO: 10 FL (ref 7.4–10.4)
RBC # BLD AUTO: 4.25 X10(6)/MCL (ref 4.2–5.4)
SPECIMEN OUTDATE: NORMAL
WBC # BLD AUTO: 3.9 X10(3)/MCL (ref 4.5–11.5)

## 2024-09-30 PROCEDURE — 86901 BLOOD TYPING SEROLOGIC RH(D): CPT

## 2024-09-30 PROCEDURE — 86900 BLOOD TYPING SEROLOGIC ABO: CPT

## 2024-09-30 PROCEDURE — 36415 COLL VENOUS BLD VENIPUNCTURE: CPT

## 2024-09-30 PROCEDURE — 86850 RBC ANTIBODY SCREEN: CPT

## 2024-09-30 PROCEDURE — 85027 COMPLETE CBC AUTOMATED: CPT

## 2024-09-30 NOTE — TELEPHONE ENCOUNTER
Physician called patient to discuss hesitancy regarding scheduled ALYSSA scheduled for Thursday which she expressed to Ms. Oliver today when she came to the clinic to bring some paperwork. She expresses that she is mostly concerned about the cosmesis of the incision that we will make on her abdomen. Explained to patient that with the size of her uterus minimally invasive approach with laparoscopy or vaginal approach is not an option. Discussed that we always try to use the smallest incision possible, but with the imaging and exam likely need for extension above the umbilicus. Patient expresses understanding and just wanted to double check that there was no other option. She desires to proceed with surgery as scheduled Thursday.     Mica Kimbrough, DO  [facility] PGY-3

## 2024-10-02 ENCOUNTER — TELEPHONE (OUTPATIENT)
Dept: GYNECOLOGY | Facility: CLINIC | Age: 49
End: 2024-10-02
Payer: OTHER GOVERNMENT

## 2024-10-02 RX ORDER — GABAPENTIN 400 MG/1
400 CAPSULE ORAL
Status: CANCELLED | OUTPATIENT
Start: 2024-10-02

## 2024-10-02 RX ORDER — ACETAMINOPHEN 500 MG
1000 TABLET ORAL
Status: CANCELLED | OUTPATIENT
Start: 2024-10-02

## 2024-10-02 RX ORDER — CELECOXIB 200 MG/1
400 CAPSULE ORAL
Status: CANCELLED | OUTPATIENT
Start: 2024-10-02

## 2024-10-02 NOTE — TELEPHONE ENCOUNTER
REJI  Received: Today  Priscilla Nunez Staff  Good morning,    Insurance states auth is still pending review for patient. They should have a response in 48HRs. How would you like to proceed?

## 2024-10-02 NOTE — TELEPHONE ENCOUNTER
Below message sent to Dr. Amy Cooper, Dr. Mica Kimbrough, and Melody Hannah LPN to make them aware.

## 2024-10-03 ENCOUNTER — HOSPITAL ENCOUNTER (INPATIENT)
Facility: HOSPITAL | Age: 49
LOS: 2 days | Discharge: HOME | DRG: 743 | End: 2024-10-05
Attending: OBSTETRICS & GYNECOLOGY | Admitting: OBSTETRICS & GYNECOLOGY
Payer: OTHER GOVERNMENT

## 2024-10-03 ENCOUNTER — ANESTHESIA (OUTPATIENT)
Dept: SURGERY | Facility: HOSPITAL | Age: 49
End: 2024-10-03
Payer: OTHER GOVERNMENT

## 2024-10-03 DIAGNOSIS — N93.9 ABNORMAL UTERINE BLEEDING (AUB): ICD-10-CM

## 2024-10-03 DIAGNOSIS — D64.9 SYMPTOMATIC ANEMIA: ICD-10-CM

## 2024-10-03 DIAGNOSIS — D25.9 UTERINE LEIOMYOMA, UNSPECIFIED LOCATION: Primary | ICD-10-CM

## 2024-10-03 DIAGNOSIS — Z90.710 STATUS POST HYSTERECTOMY: ICD-10-CM

## 2024-10-03 DIAGNOSIS — D21.9 FIBROID: ICD-10-CM

## 2024-10-03 LAB
B-HCG UR QL: NEGATIVE
CTP QC/QA: YES
GROUP & RH: NORMAL
INDIRECT COOMBS: NORMAL
SPECIMEN OUTDATE: NORMAL

## 2024-10-03 PROCEDURE — 63600175 PHARM REV CODE 636 W HCPCS: Performed by: SPECIALIST

## 2024-10-03 PROCEDURE — 96375 TX/PRO/DX INJ NEW DRUG ADDON: CPT

## 2024-10-03 PROCEDURE — 25000003 PHARM REV CODE 250

## 2024-10-03 PROCEDURE — 63600175 PHARM REV CODE 636 W HCPCS

## 2024-10-03 PROCEDURE — 25000003 PHARM REV CODE 250: Performed by: SPECIALIST

## 2024-10-03 PROCEDURE — 36000708 HC OR TIME LEV III 1ST 15 MIN: Performed by: OBSTETRICS & GYNECOLOGY

## 2024-10-03 PROCEDURE — 25000003 PHARM REV CODE 250: Performed by: NURSE ANESTHETIST, CERTIFIED REGISTERED

## 2024-10-03 PROCEDURE — 11000001 HC ACUTE MED/SURG PRIVATE ROOM

## 2024-10-03 PROCEDURE — 27201423 OPTIME MED/SURG SUP & DEVICES STERILE SUPPLY: Performed by: OBSTETRICS & GYNECOLOGY

## 2024-10-03 PROCEDURE — 99900035 HC TECH TIME PER 15 MIN (STAT)

## 2024-10-03 PROCEDURE — D9220A PRA ANESTHESIA: Mod: ANES,,, | Performed by: SPECIALIST

## 2024-10-03 PROCEDURE — 88307 TISSUE EXAM BY PATHOLOGIST: CPT | Mod: TC | Performed by: OBSTETRICS & GYNECOLOGY

## 2024-10-03 PROCEDURE — 96374 THER/PROPH/DIAG INJ IV PUSH: CPT

## 2024-10-03 PROCEDURE — 63600175 PHARM REV CODE 636 W HCPCS: Mod: JZ,JG | Performed by: OBSTETRICS & GYNECOLOGY

## 2024-10-03 PROCEDURE — 0UT70ZZ RESECTION OF BILATERAL FALLOPIAN TUBES, OPEN APPROACH: ICD-10-PCS | Performed by: OBSTETRICS & GYNECOLOGY

## 2024-10-03 PROCEDURE — 37000009 HC ANESTHESIA EA ADD 15 MINS: Performed by: OBSTETRICS & GYNECOLOGY

## 2024-10-03 PROCEDURE — 96361 HYDRATE IV INFUSION ADD-ON: CPT

## 2024-10-03 PROCEDURE — 0UT90ZZ RESECTION OF UTERUS, OPEN APPROACH: ICD-10-PCS | Performed by: OBSTETRICS & GYNECOLOGY

## 2024-10-03 PROCEDURE — 0TJB8ZZ INSPECTION OF BLADDER, VIA NATURAL OR ARTIFICIAL OPENING ENDOSCOPIC: ICD-10-PCS | Performed by: OBSTETRICS & GYNECOLOGY

## 2024-10-03 PROCEDURE — D9220A PRA ANESTHESIA: Mod: CRNA,,, | Performed by: NURSE ANESTHETIST, CERTIFIED REGISTERED

## 2024-10-03 PROCEDURE — 86901 BLOOD TYPING SEROLOGIC RH(D): CPT

## 2024-10-03 PROCEDURE — 37000008 HC ANESTHESIA 1ST 15 MINUTES: Performed by: OBSTETRICS & GYNECOLOGY

## 2024-10-03 PROCEDURE — 86850 RBC ANTIBODY SCREEN: CPT

## 2024-10-03 PROCEDURE — 81025 URINE PREGNANCY TEST: CPT | Performed by: NURSE PRACTITIONER

## 2024-10-03 PROCEDURE — 63600175 PHARM REV CODE 636 W HCPCS: Performed by: NURSE ANESTHETIST, CERTIFIED REGISTERED

## 2024-10-03 PROCEDURE — 36000709 HC OR TIME LEV III EA ADD 15 MIN: Performed by: OBSTETRICS & GYNECOLOGY

## 2024-10-03 PROCEDURE — 96360 HYDRATION IV INFUSION INIT: CPT | Mod: 59

## 2024-10-03 PROCEDURE — 0UN90ZZ RELEASE UTERUS, OPEN APPROACH: ICD-10-PCS | Performed by: OBSTETRICS & GYNECOLOGY

## 2024-10-03 PROCEDURE — 71000033 HC RECOVERY, INTIAL HOUR: Performed by: OBSTETRICS & GYNECOLOGY

## 2024-10-03 PROCEDURE — G0378 HOSPITAL OBSERVATION PER HR: HCPCS

## 2024-10-03 RX ORDER — ACETAMINOPHEN 10 MG/ML
1000 INJECTION, SOLUTION INTRAVENOUS ONCE
Status: DISCONTINUED | OUTPATIENT
Start: 2024-10-03 | End: 2024-10-03

## 2024-10-03 RX ORDER — DIPHENHYDRAMINE HYDROCHLORIDE 50 MG/ML
25 INJECTION INTRAMUSCULAR; INTRAVENOUS EVERY 4 HOURS PRN
Status: DISCONTINUED | OUTPATIENT
Start: 2024-10-03 | End: 2024-10-05 | Stop reason: HOSPADM

## 2024-10-03 RX ORDER — ONDANSETRON 4 MG/1
4 TABLET, ORALLY DISINTEGRATING ORAL EVERY 6 HOURS PRN
Status: DISCONTINUED | OUTPATIENT
Start: 2024-10-03 | End: 2024-10-05 | Stop reason: HOSPADM

## 2024-10-03 RX ORDER — MEPERIDINE HYDROCHLORIDE 25 MG/ML
12.5 INJECTION INTRAMUSCULAR; INTRAVENOUS; SUBCUTANEOUS ONCE
Status: DISCONTINUED | OUTPATIENT
Start: 2024-10-03 | End: 2024-10-03 | Stop reason: HOSPADM

## 2024-10-03 RX ORDER — DIPHENHYDRAMINE HYDROCHLORIDE 50 MG/ML
25 INJECTION INTRAMUSCULAR; INTRAVENOUS ONCE AS NEEDED
Status: DISCONTINUED | OUTPATIENT
Start: 2024-10-03 | End: 2024-10-03 | Stop reason: HOSPADM

## 2024-10-03 RX ORDER — ACETAMINOPHEN 325 MG/1
650 TABLET ORAL EVERY 8 HOURS
Status: DISCONTINUED | OUTPATIENT
Start: 2024-10-03 | End: 2024-10-05 | Stop reason: HOSPADM

## 2024-10-03 RX ORDER — BISACODYL 10 MG/1
10 SUPPOSITORY RECTAL DAILY PRN
Status: DISCONTINUED | OUTPATIENT
Start: 2024-10-03 | End: 2024-10-05 | Stop reason: HOSPADM

## 2024-10-03 RX ORDER — GLYCOPYRROLATE 0.2 MG/ML
INJECTION INTRAMUSCULAR; INTRAVENOUS
Status: DISCONTINUED | OUTPATIENT
Start: 2024-10-03 | End: 2024-10-03

## 2024-10-03 RX ORDER — MIDAZOLAM HYDROCHLORIDE 2 MG/2ML
2 INJECTION, SOLUTION INTRAMUSCULAR; INTRAVENOUS ONCE
Status: COMPLETED | OUTPATIENT
Start: 2024-10-03 | End: 2024-10-03

## 2024-10-03 RX ORDER — OXYCODONE HYDROCHLORIDE 5 MG/1
5 TABLET ORAL EVERY 4 HOURS PRN
Qty: 15 TABLET | Refills: 0 | Status: SHIPPED | OUTPATIENT
Start: 2024-10-03 | End: 2024-10-05

## 2024-10-03 RX ORDER — VASOPRESSIN 20 [USP'U]/ML
20 INJECTION, SOLUTION INTRAMUSCULAR; SUBCUTANEOUS ONCE
Status: DISCONTINUED | OUTPATIENT
Start: 2024-10-03 | End: 2024-10-03 | Stop reason: HOSPADM

## 2024-10-03 RX ORDER — TRAMADOL HYDROCHLORIDE 50 MG/1
50 TABLET ORAL
Status: COMPLETED | OUTPATIENT
Start: 2024-10-03 | End: 2024-10-03

## 2024-10-03 RX ORDER — ONDANSETRON HYDROCHLORIDE 2 MG/ML
INJECTION, SOLUTION INTRAVENOUS
Status: DISCONTINUED | OUTPATIENT
Start: 2024-10-03 | End: 2024-10-03

## 2024-10-03 RX ORDER — OXYCODONE HYDROCHLORIDE 5 MG/1
5 TABLET ORAL EVERY 4 HOURS PRN
Status: DISCONTINUED | OUTPATIENT
Start: 2024-10-03 | End: 2024-10-05 | Stop reason: HOSPADM

## 2024-10-03 RX ORDER — FENTANYL CITRATE 50 UG/ML
INJECTION, SOLUTION INTRAMUSCULAR; INTRAVENOUS
Status: DISCONTINUED | OUTPATIENT
Start: 2024-10-03 | End: 2024-10-03

## 2024-10-03 RX ORDER — DIPHENHYDRAMINE HCL 25 MG
25 CAPSULE ORAL EVERY 4 HOURS PRN
Status: DISCONTINUED | OUTPATIENT
Start: 2024-10-03 | End: 2024-10-05 | Stop reason: HOSPADM

## 2024-10-03 RX ORDER — HYDROMORPHONE HYDROCHLORIDE 1 MG/ML
0.5 INJECTION, SOLUTION INTRAMUSCULAR; INTRAVENOUS; SUBCUTANEOUS EVERY 5 MIN PRN
Status: DISCONTINUED | OUTPATIENT
Start: 2024-10-03 | End: 2024-10-03 | Stop reason: HOSPADM

## 2024-10-03 RX ORDER — SENNOSIDES 8.6 MG/1
1 TABLET ORAL DAILY
Qty: 30 TABLET | Refills: 0 | Status: SHIPPED | OUTPATIENT
Start: 2024-10-03 | End: 2024-10-05

## 2024-10-03 RX ORDER — AMOXICILLIN 250 MG
1 CAPSULE ORAL 2 TIMES DAILY
Status: DISCONTINUED | OUTPATIENT
Start: 2024-10-03 | End: 2024-10-05 | Stop reason: HOSPADM

## 2024-10-03 RX ORDER — PROPOFOL 10 MG/ML
VIAL (ML) INTRAVENOUS
Status: DISCONTINUED | OUTPATIENT
Start: 2024-10-03 | End: 2024-10-03

## 2024-10-03 RX ORDER — DEXAMETHASONE SODIUM PHOSPHATE 4 MG/ML
INJECTION, SOLUTION INTRA-ARTICULAR; INTRALESIONAL; INTRAMUSCULAR; INTRAVENOUS; SOFT TISSUE
Status: DISCONTINUED | OUTPATIENT
Start: 2024-10-03 | End: 2024-10-03

## 2024-10-03 RX ORDER — HYDROMORPHONE HYDROCHLORIDE 1 MG/ML
0.2 INJECTION, SOLUTION INTRAMUSCULAR; INTRAVENOUS; SUBCUTANEOUS ONCE
Status: COMPLETED | OUTPATIENT
Start: 2024-10-03 | End: 2024-10-03

## 2024-10-03 RX ORDER — IBUPROFEN 800 MG/1
800 TABLET ORAL EVERY 6 HOURS
Qty: 120 TABLET | Refills: 0 | Status: SHIPPED | OUTPATIENT
Start: 2024-10-03 | End: 2024-10-05

## 2024-10-03 RX ORDER — IPRATROPIUM BROMIDE AND ALBUTEROL SULFATE 2.5; .5 MG/3ML; MG/3ML
3 SOLUTION RESPIRATORY (INHALATION) ONCE AS NEEDED
Status: DISCONTINUED | OUTPATIENT
Start: 2024-10-03 | End: 2024-10-03 | Stop reason: HOSPADM

## 2024-10-03 RX ORDER — POLYETHYLENE GLYCOL 3350 17 G/17G
17 POWDER, FOR SOLUTION ORAL NIGHTLY
Status: DISCONTINUED | OUTPATIENT
Start: 2024-10-03 | End: 2024-10-05 | Stop reason: HOSPADM

## 2024-10-03 RX ORDER — SODIUM CHLORIDE 9 MG/ML
INJECTION, SOLUTION INTRAVENOUS CONTINUOUS
Status: DISCONTINUED | OUTPATIENT
Start: 2024-10-03 | End: 2024-10-04

## 2024-10-03 RX ORDER — OXYCODONE HYDROCHLORIDE 5 MG/1
10 TABLET ORAL EVERY 4 HOURS PRN
Status: DISCONTINUED | OUTPATIENT
Start: 2024-10-03 | End: 2024-10-05 | Stop reason: HOSPADM

## 2024-10-03 RX ORDER — SODIUM CHLORIDE, SODIUM LACTATE, POTASSIUM CHLORIDE, CALCIUM CHLORIDE 600; 310; 30; 20 MG/100ML; MG/100ML; MG/100ML; MG/100ML
INJECTION, SOLUTION INTRAVENOUS CONTINUOUS
Status: DISCONTINUED | OUTPATIENT
Start: 2024-10-03 | End: 2024-10-04

## 2024-10-03 RX ORDER — HYDROMORPHONE HYDROCHLORIDE 1 MG/ML
INJECTION, SOLUTION INTRAMUSCULAR; INTRAVENOUS; SUBCUTANEOUS
Status: DISCONTINUED | OUTPATIENT
Start: 2024-10-03 | End: 2024-10-03

## 2024-10-03 RX ORDER — ACETAMINOPHEN 500 MG
1000 TABLET ORAL
Status: COMPLETED | OUTPATIENT
Start: 2024-10-03 | End: 2024-10-03

## 2024-10-03 RX ORDER — ONDANSETRON HYDROCHLORIDE 2 MG/ML
4 INJECTION, SOLUTION INTRAVENOUS ONCE
Status: DISCONTINUED | OUTPATIENT
Start: 2024-10-03 | End: 2024-10-03 | Stop reason: HOSPADM

## 2024-10-03 RX ORDER — KETOROLAC TROMETHAMINE 30 MG/ML
30 INJECTION, SOLUTION INTRAMUSCULAR; INTRAVENOUS EVERY 8 HOURS
Status: DISPENSED | OUTPATIENT
Start: 2024-10-03 | End: 2024-10-04

## 2024-10-03 RX ORDER — HYDROMORPHONE HYDROCHLORIDE 1 MG/ML
0.2 INJECTION, SOLUTION INTRAMUSCULAR; INTRAVENOUS; SUBCUTANEOUS EVERY 5 MIN PRN
Status: DISCONTINUED | OUTPATIENT
Start: 2024-10-03 | End: 2024-10-03 | Stop reason: HOSPADM

## 2024-10-03 RX ORDER — OXYCODONE AND ACETAMINOPHEN 5; 325 MG/1; MG/1
2 TABLET ORAL ONCE
Status: DISCONTINUED | OUTPATIENT
Start: 2024-10-03 | End: 2024-10-03 | Stop reason: HOSPADM

## 2024-10-03 RX ORDER — CEFAZOLIN SODIUM 2 G/50ML
2 SOLUTION INTRAVENOUS
Status: COMPLETED | OUTPATIENT
Start: 2024-10-03 | End: 2024-10-03

## 2024-10-03 RX ORDER — SCOLOPAMINE TRANSDERMAL SYSTEM 1 MG/1
1 PATCH, EXTENDED RELEASE TRANSDERMAL ONCE
Status: DISCONTINUED | OUTPATIENT
Start: 2024-10-03 | End: 2024-10-05 | Stop reason: HOSPADM

## 2024-10-03 RX ORDER — KETOROLAC TROMETHAMINE 30 MG/ML
INJECTION, SOLUTION INTRAMUSCULAR; INTRAVENOUS
Status: DISCONTINUED | OUTPATIENT
Start: 2024-10-03 | End: 2024-10-03

## 2024-10-03 RX ORDER — GLUCAGON 1 MG
1 KIT INJECTION
Status: DISCONTINUED | OUTPATIENT
Start: 2024-10-03 | End: 2024-10-03 | Stop reason: HOSPADM

## 2024-10-03 RX ORDER — LIDOCAINE HYDROCHLORIDE 20 MG/ML
INJECTION INTRAVENOUS
Status: DISCONTINUED | OUTPATIENT
Start: 2024-10-03 | End: 2024-10-03

## 2024-10-03 RX ORDER — IBUPROFEN 600 MG/1
600 TABLET ORAL EVERY 6 HOURS
Status: DISCONTINUED | OUTPATIENT
Start: 2024-10-04 | End: 2024-10-05 | Stop reason: HOSPADM

## 2024-10-03 RX ORDER — MUPIROCIN 20 MG/G
OINTMENT TOPICAL 2 TIMES DAILY
Status: DISCONTINUED | OUTPATIENT
Start: 2024-10-03 | End: 2024-10-05 | Stop reason: HOSPADM

## 2024-10-03 RX ORDER — ROCURONIUM BROMIDE 10 MG/ML
INJECTION, SOLUTION INTRAVENOUS
Status: DISCONTINUED | OUTPATIENT
Start: 2024-10-03 | End: 2024-10-03

## 2024-10-03 RX ORDER — VASOPRESSIN 20 [USP'U]/ML
INJECTION, SOLUTION INTRAMUSCULAR; SUBCUTANEOUS
Status: DISCONTINUED | OUTPATIENT
Start: 2024-10-03 | End: 2024-10-03 | Stop reason: HOSPADM

## 2024-10-03 RX ORDER — GABAPENTIN 300 MG/1
600 CAPSULE ORAL
Status: COMPLETED | OUTPATIENT
Start: 2024-10-03 | End: 2024-10-03

## 2024-10-03 RX ORDER — PHENYLEPHRINE HYDROCHLORIDE 10 MG/ML
INJECTION INTRAVENOUS
Status: DISCONTINUED | OUTPATIENT
Start: 2024-10-03 | End: 2024-10-03

## 2024-10-03 RX ORDER — PROCHLORPERAZINE EDISYLATE 5 MG/ML
5 INJECTION INTRAMUSCULAR; INTRAVENOUS ONCE AS NEEDED
Status: COMPLETED | OUTPATIENT
Start: 2024-10-03 | End: 2024-10-03

## 2024-10-03 RX ORDER — ONDANSETRON HYDROCHLORIDE 2 MG/ML
4 INJECTION, SOLUTION INTRAVENOUS EVERY 6 HOURS PRN
Status: DISCONTINUED | OUTPATIENT
Start: 2024-10-03 | End: 2024-10-05 | Stop reason: HOSPADM

## 2024-10-03 RX ADMIN — POLYETHYLENE GLYCOL 3350 17 G: 17 POWDER, FOR SOLUTION ORAL at 08:10

## 2024-10-03 RX ADMIN — MIDAZOLAM HYDROCHLORIDE 2 MG: 1 INJECTION, SOLUTION INTRAMUSCULAR; INTRAVENOUS at 09:10

## 2024-10-03 RX ADMIN — MUPIROCIN: 20 OINTMENT TOPICAL at 08:10

## 2024-10-03 RX ADMIN — LIDOCAINE HYDROCHLORIDE 50 MG: 20 INJECTION INTRAVENOUS at 09:10

## 2024-10-03 RX ADMIN — PHENYLEPHRINE HYDROCHLORIDE 100 MCG: 10 INJECTION INTRAVENOUS at 11:10

## 2024-10-03 RX ADMIN — PHENYLEPHRINE HYDROCHLORIDE 200 MCG: 10 INJECTION INTRAVENOUS at 12:10

## 2024-10-03 RX ADMIN — ONDANSETRON 4 MG: 2 INJECTION INTRAMUSCULAR; INTRAVENOUS at 11:10

## 2024-10-03 RX ADMIN — SCOPOLAMINE 1 PATCH: 1 PATCH TRANSDERMAL at 08:10

## 2024-10-03 RX ADMIN — ACETAMINOPHEN 1000 MG: 500 TABLET, FILM COATED ORAL at 08:10

## 2024-10-03 RX ADMIN — SODIUM CHLORIDE, POTASSIUM CHLORIDE, SODIUM LACTATE AND CALCIUM CHLORIDE: 600; 310; 30; 20 INJECTION, SOLUTION INTRAVENOUS at 09:10

## 2024-10-03 RX ADMIN — SODIUM CHLORIDE, POTASSIUM CHLORIDE, SODIUM LACTATE AND CALCIUM CHLORIDE: 600; 310; 30; 20 INJECTION, SOLUTION INTRAVENOUS at 10:10

## 2024-10-03 RX ADMIN — TRAMADOL HYDROCHLORIDE 50 MG: 50 TABLET, COATED ORAL at 08:10

## 2024-10-03 RX ADMIN — PHENYLEPHRINE HYDROCHLORIDE 300 MCG: 10 INJECTION INTRAVENOUS at 12:10

## 2024-10-03 RX ADMIN — PHENYLEPHRINE HYDROCHLORIDE 400 MCG: 10 INJECTION INTRAVENOUS at 12:10

## 2024-10-03 RX ADMIN — KETOROLAC TROMETHAMINE 30 MG: 30 INJECTION, SOLUTION INTRAMUSCULAR at 11:10

## 2024-10-03 RX ADMIN — ROCURONIUM BROMIDE 25 MG: 10 INJECTION INTRAVENOUS at 10:10

## 2024-10-03 RX ADMIN — PROCHLORPERAZINE EDISYLATE 5 MG: 5 INJECTION INTRAMUSCULAR; INTRAVENOUS at 02:10

## 2024-10-03 RX ADMIN — FENTANYL CITRATE 50 MCG: 50 INJECTION INTRAMUSCULAR; INTRAVENOUS at 11:10

## 2024-10-03 RX ADMIN — CEFAZOLIN SODIUM 2 G: 2 SOLUTION INTRAVENOUS at 09:10

## 2024-10-03 RX ADMIN — DEXAMETHASONE SODIUM PHOSPHATE 8 MG: 4 INJECTION, SOLUTION INTRA-ARTICULAR; INTRALESIONAL; INTRAMUSCULAR; INTRAVENOUS; SOFT TISSUE at 09:10

## 2024-10-03 RX ADMIN — PROPOFOL 150 MG: 10 INJECTION, EMULSION INTRAVENOUS at 09:10

## 2024-10-03 RX ADMIN — GABAPENTIN 600 MG: 300 CAPSULE ORAL at 08:10

## 2024-10-03 RX ADMIN — HYDROMORPHONE HYDROCHLORIDE 0.25 MG: 1 INJECTION, SOLUTION INTRAMUSCULAR; INTRAVENOUS; SUBCUTANEOUS at 11:10

## 2024-10-03 RX ADMIN — ACETAMINOPHEN 1000 MG: 10 INJECTION, SOLUTION INTRAVENOUS at 02:10

## 2024-10-03 RX ADMIN — HYDROMORPHONE HYDROCHLORIDE 0.25 MG: 1 INJECTION, SOLUTION INTRAMUSCULAR; INTRAVENOUS; SUBCUTANEOUS at 12:10

## 2024-10-03 RX ADMIN — SODIUM CHLORIDE, POTASSIUM CHLORIDE, SODIUM LACTATE AND CALCIUM CHLORIDE: 600; 310; 30; 20 INJECTION, SOLUTION INTRAVENOUS at 08:10

## 2024-10-03 RX ADMIN — HYDROMORPHONE HYDROCHLORIDE 0.2 MG: 1 INJECTION, SOLUTION INTRAMUSCULAR; INTRAVENOUS; SUBCUTANEOUS at 05:10

## 2024-10-03 RX ADMIN — SUGAMMADEX 200 MG: 100 INJECTION, SOLUTION INTRAVENOUS at 11:10

## 2024-10-03 RX ADMIN — KETOROLAC TROMETHAMINE 30 MG: 30 INJECTION, SOLUTION INTRAMUSCULAR; INTRAVENOUS at 09:10

## 2024-10-03 RX ADMIN — FENTANYL CITRATE 100 MCG: 50 INJECTION INTRAMUSCULAR; INTRAVENOUS at 09:10

## 2024-10-03 RX ADMIN — SENNOSIDES AND DOCUSATE SODIUM 1 TABLET: 50; 8.6 TABLET ORAL at 08:10

## 2024-10-03 RX ADMIN — ONDANSETRON 4 MG: 2 INJECTION INTRAMUSCULAR; INTRAVENOUS at 06:10

## 2024-10-03 RX ADMIN — ROCURONIUM BROMIDE 50 MG: 10 INJECTION INTRAVENOUS at 09:10

## 2024-10-03 RX ADMIN — SODIUM CHLORIDE, POTASSIUM CHLORIDE, SODIUM LACTATE AND CALCIUM CHLORIDE: 600; 310; 30; 20 INJECTION, SOLUTION INTRAVENOUS at 03:10

## 2024-10-03 RX ADMIN — GLYCOPYRROLATE 0.2 MG: 0.2 INJECTION INTRAMUSCULAR; INTRAVENOUS at 09:10

## 2024-10-03 RX ADMIN — LIDOCAINE HYDROCHLORIDE 75 MG: 20 INJECTION INTRAVENOUS at 12:10

## 2024-10-03 RX ADMIN — FENTANYL CITRATE 50 MCG: 50 INJECTION INTRAMUSCULAR; INTRAVENOUS at 10:10

## 2024-10-03 NOTE — ANESTHESIA PROCEDURE NOTES
Intubation    Date/Time: 10/3/2024 9:18 AM    Performed by: Galilea Santiago CRNA  Authorized by: Jacqueline Stuart MD    Intubation:     Induction:  Intravenous    Intubated:  Postinduction    Mask Ventilation:  Easy mask    Attempts:  1    Attempted By:  CRNA    Method of Intubation:  Direct    Blade:  Marshall 2    Laryngeal View Grade: Grade I - full view of cords      Difficult Airway Encountered?: No      Complications:  None    Airway Device:  Oral endotracheal tube    Airway Device Size:  7.0    Style/Cuff Inflation:  Cuffed (inflated to minimal occlusive pressure)    Inflation Amount (mL):  5    Tube secured:  21    Secured at:  The lips    Placement Verified By:  Capnometry    Complicating Factors:  None    Findings Post-Intubation:  BS equal bilateral and atraumatic/condition of teeth unchanged

## 2024-10-03 NOTE — LETTER
October 5, 2024         [address] [facility] [address]  Phone: [phone]  Fax: [phone]       Patient: Vanesa Whitlock   YOB: 1975  Date of Visit: 10/05/2024    To Whom It May Concern:    China Whitlock  was at [facility] on 10/05/2024. The patient may return to work/school on *** {With/no:12133} restrictions. If you have any questions or concerns, or if I can be of further assistance, please do not hesitate to contact me.    Sincerely,    Selene Barreto RN

## 2024-10-03 NOTE — TRANSFER OF CARE
Anesthesia Transfer of Care Note    Patient: Vanesa Whitlock    Procedure(s) Performed: Procedure(s) (LRB):  HYSTERECTOMY, TOTAL, ABDOMINAL (N/A)  CYSTOSCOPY (N/A)    Patient location: PACU    Anesthesia Type: general    Transport from OR: Transported from OR on room air with adequate spontaneous ventilation    Post pain: adequate analgesia    Post assessment: no apparent anesthetic complications and tolerated procedure well    Post vital signs: stable    Level of consciousness: sedated    Nausea/Vomiting: no nausea/vomiting    Complications: none    Transfer of care protocol was followedComments: Report to Taina FUENTES    Last vitals: Visit Vitals  BP (!) 178/100   Pulse 78   Temp 36.3 °C (97.3 °F) (Tympanic)   Resp 20   Wt 64.9 kg (143 lb)   LMP 09/24/2024 (Exact Date)   SpO2 (!) 89%   Breastfeeding No   BMI 27.02 kg/m²

## 2024-10-03 NOTE — ANESTHESIA POSTPROCEDURE EVALUATION
Anesthesia Post Evaluation    Patient: Vanesa Whitlock    Procedure(s) Performed: Procedure(s) (LRB):  HYSTERECTOMY, TOTAL, ABDOMINAL (N/A)  CYSTOSCOPY (N/A)    Final Anesthesia Type: general      Patient location during evaluation: PACU  Patient participation: Yes- Able to Participate  Level of consciousness: awake and responds to stimulation  Post-procedure vital signs: reviewed and stable  Pain management: adequate  Airway patency: patent    PONV status at discharge: No PONV  Anesthetic complications: no      Cardiovascular status: blood pressure returned to baseline  Respiratory status: unassisted  Hydration status: euvolemic  Follow-up not needed.              Vitals Value Taken Time   /83 10/03/24 1300   Temp 36.6 °C (97.8 °F) 10/03/24 1233   Pulse 72 10/03/24 1300   Resp 20 10/03/24 1300   SpO2 100 % 10/03/24 1300         No case tracking events are documented in the log.      Pain/Lonny Score: Pain Rating Prior to Med Admin: 3 (10/3/2024  8:07 AM)  Lonny Score: 4 (10/3/2024 12:45 PM)

## 2024-10-03 NOTE — INTERVAL H&P NOTE
[facility] Preop H&P Update     I have seen and examined the patient on the morning of her surgery  I attest that there are no changes in her medical history since the time the H&P was performed.     BP (!) 155/87   Pulse 74   Temp 98 °F (36.7 °C) (Oral)   Resp 16   Wt 64.9 kg (143 lb)   LMP 09/24/2024 (Exact Date)   SpO2 97%   Breastfeeding No   BMI 27.02 kg/m²      Abdominal incisions were reviewed.  Medications DOS were reviewed.  Denies fever/chill, nausea/vomiting, abdominal pain, chest pain, SOB.     She would like her mother to be contacted after her surgery and the details of her surgery shared.   She was able to explain the procedure in her own words.  Postoperative care was briefly re-reviewed    She desires to proceed with surgery as planned this morning.    ERAS Protocol   DVT PPX: SCDs  ABX: 2 g Ancef    To OR for scheduled ALYSSA/BS/Cysto for uteromegaly and AUB-L.     Mica Kimbrough,   [facility] PGY-3

## 2024-10-03 NOTE — PROGRESS NOTES
GYNECOLOGY INPATIENT PROGRESS NOTE    Vanesa Whitlock is a 49 y.o. female  s/p ALYSSA/BS/Cysto. POD0/HD1.       The patient feels well since surgery, slight pain. Pain is overall well controlled with current medications. Pt not yet ambulating or voiding spontaneously 2/2 suárez in place. Not yet passing flatus, has not had anything PO yet.    Review of Systems  Denies fevers, chills, headaches nausea, vomiting, dizziness, chest pain, or shortness of breath.     Medications      acetaminophen  650 mg Oral Q8H    [START ON 10/4/2024] ibuprofen  600 mg Oral Q6H    ketorolac  30 mg Intravenous Q8H    meperidine  12.5 mg Intravenous Once    mupirocin   Nasal BID    ondansetron  4 mg Intravenous Once    oxyCODONE-acetaminophen  2 tablet Oral Once    polyethylene glycol  17 g Oral QHS    scopolamine  1 patch Transdermal Once    senna-docusate 8.6-50 mg  1 tablet Oral BID    vasopressin  20 Units Irrigation Once         0.9% NaCl   Intravenous Continuous        lactated ringers   Intravenous Continuous 10 mL/hr at 10/03/24 0933 New Bag at 10/03/24 0933    lactated ringers   Intravenous Continuous            Current Facility-Administered Medications:     albuterol-ipratropium, 3 mL, Nebulization, Once PRN    bisacodyL, 10 mg, Rectal, Daily PRN    dextrose 10%, 12.5 g, Intravenous, PRN    dextrose 10%, 25 g, Intravenous, PRN    diphenhydrAMINE, 25 mg, Oral, Q4H PRN    diphenhydrAMINE, 25 mg, Intravenous, Once PRN    diphenhydrAMINE, 25 mg, Intravenous, Q4H PRN    glucagon (human recombinant), 1 mg, Intramuscular, PRN    HYDROmorphone, 0.2 mg, Intravenous, Q5 Min PRN    HYDROmorphone, 0.5 mg, Intravenous, Q5 Min PRN    ondansetron, 4 mg, Oral, Q6H PRN    oxyCODONE, 10 mg, Oral, Q4H PRN    oxyCODONE, 5 mg, Oral, Q4H PRN    Objective    Vitals:    10/03/24 1415 10/03/24 1430 10/03/24 1445 10/03/24 1500   BP: (!) 164/82 139/79 138/75 (!) 161/81   BP Location: Left arm Left arm Left arm Left arm   Patient Position: Lying  Lying  Lying   Pulse: 69 79 63 65   Resp: 19 20 17 20   Temp:       TempSrc:       SpO2: 98% 98% 98% 95%   Weight:           UOP: 400 mL in 2 hours since surgery    Physical Exam    General: alert and oriented, in no acute distress, drowsy from medications   Lungs: clear to auscultation bilaterally   Heart:: regular rate and rhythm   Abdomen Soft, appropriately tender post-op, active bowel sounds   Incision: Telfa with overlying tegaderm clean, dry, intact with minimal strike through    DVT Evaluation: No cords or calf tenderness.  No significant calf/ankle edema.   Edema: None     Labs  Trended Lab Data:  Recent Labs   Lab 09/30/24  1228   WBC 3.90*   HGB 11.9*   HCT 37.6      MCV 88.5   RDW 12.4       Assessment/Plan  49 y.o. POD# 0 s/p ALYSSA/BS/Cysto doing well.    Neuro: Pain control on scheduled tylenol, toradol -> ibuprofen. PRN oxy 5/10.   Hemodynamics: H/H at presentation 11.9/37.6 -> EBL 50 mL -> will follow up AM CBC  Cardiovascular: Vitals stable overall, single hypertensive BP   Respiratory: sp02 98% on r/a  GI/FEN: Continue regular diet  Renal/I&O:    mL over 2 hours  IVF off at this time   Infectious Disease: Afebrile, s/p 2g Ancef in OR  Endocrine: No issues  Prophylaxis:  SCDs, IS  Plan is to continue monitoring pt overnight with likely discharge in AM or the following day if no complaints/issues overnight.    Discussed with staff, Dr. Cooper.     Mica Kimbrough, DO  [facility] PGY-3

## 2024-10-04 PROBLEM — N93.9 ABNORMAL UTERINE BLEEDING (AUB): Status: ACTIVE | Noted: 2024-10-04

## 2024-10-04 PROBLEM — D21.9 FIBROID: Status: ACTIVE | Noted: 2024-09-16

## 2024-10-04 LAB
ALBUMIN SERPL-MCNC: 3.1 G/DL (ref 3.5–5)
ALBUMIN/GLOB SERPL: 1.1 RATIO (ref 1.1–2)
ALP SERPL-CCNC: 98 UNIT/L (ref 40–150)
ALT SERPL-CCNC: 17 UNIT/L (ref 0–55)
ANION GAP SERPL CALC-SCNC: 7 MEQ/L
AST SERPL-CCNC: 22 UNIT/L (ref 5–34)
BASOPHILS # BLD AUTO: 0.01 X10(3)/MCL
BASOPHILS NFR BLD AUTO: 0.1 %
BILIRUB SERPL-MCNC: 1.5 MG/DL
BUN SERPL-MCNC: 6.1 MG/DL (ref 7–18.7)
CALCIUM SERPL-MCNC: 8.8 MG/DL (ref 8.4–10.2)
CHLORIDE SERPL-SCNC: 107 MMOL/L (ref 98–107)
CO2 SERPL-SCNC: 24 MMOL/L (ref 22–29)
CREAT SERPL-MCNC: 0.7 MG/DL (ref 0.55–1.02)
CREAT/UREA NIT SERPL: 9
EOSINOPHIL # BLD AUTO: 0 X10(3)/MCL (ref 0–0.9)
EOSINOPHIL NFR BLD AUTO: 0 %
ERYTHROCYTE [DISTWIDTH] IN BLOOD BY AUTOMATED COUNT: 12.3 % (ref 11.5–17)
GFR SERPLBLD CREATININE-BSD FMLA CKD-EPI: >60 ML/MIN/1.73/M2
GLOBULIN SER-MCNC: 2.9 GM/DL (ref 2.4–3.5)
GLUCOSE SERPL-MCNC: 123 MG/DL (ref 74–100)
HCT VFR BLD AUTO: 32.8 % (ref 37–47)
HGB BLD-MCNC: 10.5 G/DL (ref 12–16)
IMM GRANULOCYTES # BLD AUTO: 0.03 X10(3)/MCL (ref 0–0.04)
IMM GRANULOCYTES NFR BLD AUTO: 0.4 %
LYMPHOCYTES # BLD AUTO: 1.16 X10(3)/MCL (ref 0.6–4.6)
LYMPHOCYTES NFR BLD AUTO: 14 %
MCH RBC QN AUTO: 27.6 PG (ref 27–31)
MCHC RBC AUTO-ENTMCNC: 32 G/DL (ref 33–36)
MCV RBC AUTO: 86.3 FL (ref 80–94)
MONOCYTES # BLD AUTO: 0.85 X10(3)/MCL (ref 0.1–1.3)
MONOCYTES NFR BLD AUTO: 10.2 %
NEUTROPHILS # BLD AUTO: 6.26 X10(3)/MCL (ref 2.1–9.2)
NEUTROPHILS NFR BLD AUTO: 75.3 %
NRBC BLD AUTO-RTO: 0 %
PLATELET # BLD AUTO: 307 X10(3)/MCL (ref 130–400)
PMV BLD AUTO: 10.4 FL (ref 7.4–10.4)
POTASSIUM SERPL-SCNC: 3.5 MMOL/L (ref 3.5–5.1)
PROT SERPL-MCNC: 6 GM/DL (ref 6.4–8.3)
RBC # BLD AUTO: 3.8 X10(6)/MCL (ref 4.2–5.4)
SODIUM SERPL-SCNC: 138 MMOL/L (ref 136–145)
WBC # BLD AUTO: 8.31 X10(3)/MCL (ref 4.5–11.5)

## 2024-10-04 PROCEDURE — 11000001 HC ACUTE MED/SURG PRIVATE ROOM

## 2024-10-04 PROCEDURE — 94761 N-INVAS EAR/PLS OXIMETRY MLT: CPT

## 2024-10-04 PROCEDURE — 85025 COMPLETE CBC W/AUTO DIFF WBC: CPT

## 2024-10-04 PROCEDURE — 99900035 HC TECH TIME PER 15 MIN (STAT)

## 2024-10-04 PROCEDURE — 36415 COLL VENOUS BLD VENIPUNCTURE: CPT

## 2024-10-04 PROCEDURE — G0378 HOSPITAL OBSERVATION PER HR: HCPCS

## 2024-10-04 PROCEDURE — 25000003 PHARM REV CODE 250

## 2024-10-04 PROCEDURE — 80053 COMPREHEN METABOLIC PANEL: CPT

## 2024-10-04 PROCEDURE — 96361 HYDRATE IV INFUSION ADD-ON: CPT

## 2024-10-04 RX ADMIN — IBUPROFEN 600 MG: 600 TABLET, FILM COATED ORAL at 03:10

## 2024-10-04 RX ADMIN — SENNOSIDES AND DOCUSATE SODIUM 1 TABLET: 50; 8.6 TABLET ORAL at 08:10

## 2024-10-04 RX ADMIN — ONDANSETRON 4 MG: 4 TABLET, ORALLY DISINTEGRATING ORAL at 08:10

## 2024-10-04 RX ADMIN — OXYCODONE HYDROCHLORIDE 5 MG: 5 TABLET ORAL at 03:10

## 2024-10-04 RX ADMIN — OXYCODONE HYDROCHLORIDE 5 MG: 5 TABLET ORAL at 09:10

## 2024-10-04 RX ADMIN — POLYETHYLENE GLYCOL 3350 17 G: 17 POWDER, FOR SOLUTION ORAL at 08:10

## 2024-10-04 RX ADMIN — OXYCODONE HYDROCHLORIDE 5 MG: 5 TABLET ORAL at 11:10

## 2024-10-04 RX ADMIN — MUPIROCIN: 20 OINTMENT TOPICAL at 08:10

## 2024-10-04 NOTE — PROGRESS NOTES
GYNECOLOGY INPATIENT PROGRESS NOTE    Vanesa Whitlock is a 49 y.o. female  s/p ALYSSA/BS/Cysto. POD1/HD2.       The patient feels well since surgery, she feels that her pain is well controlled on her current regimen, and rates it a 5/10 in severity. She is tolerating PO diet without nausea or vomiting. Not yet ambulating or voiding 2/2 suárez in place. Not yet passing flatus.     Review of Systems  Denies fevers, chills, headaches nausea, vomiting, dizziness, chest pain, or shortness of breath.     Medications      acetaminophen  650 mg Oral Q8H    ibuprofen  600 mg Oral Q6H    ketorolac  30 mg Intravenous Q8H    mupirocin   Nasal BID    polyethylene glycol  17 g Oral QHS    scopolamine  1 patch Transdermal Once    senna-docusate 8.6-50 mg  1 tablet Oral BID               Current Facility-Administered Medications:     bisacodyL, 10 mg, Rectal, Daily PRN    dextrose 10%, 12.5 g, Intravenous, PRN    dextrose 10%, 25 g, Intravenous, PRN    diphenhydrAMINE, 25 mg, Oral, Q4H PRN    diphenhydrAMINE, 25 mg, Intravenous, Q4H PRN    ondansetron, 4 mg, Oral, Q6H PRN    ondansetron, 4 mg, Intravenous, Q6H PRN    oxyCODONE, 10 mg, Oral, Q4H PRN    oxyCODONE, 5 mg, Oral, Q4H PRN    Objective    Vitals:    10/04/24 0304 10/04/24 0328 10/04/24 0711 10/04/24 0723   BP:  (!) 152/82 135/73    BP Location:       Patient Position:       Pulse:  61 (!) 57    Resp: 17 18 18    Temp:  98.9 °F (37.2 °C) 99.6 °F (37.6 °C)    TempSrc:  Oral Oral    SpO2:  97% 98% 98%   Weight:       Height:             UOP: 108mL/hr over last 12 hours   Physical Exam    General: alert and oriented, in no acute distress   Lungs: clear to auscultation bilaterally   Heart:: regular rate and rhythm   Abdomen Soft, appropriately tender post-op, active bowel sounds   Incision: Telfa with overlying tegaderm with minimal strikethrough, primarily on most inferior telfa; otherwise clean, dry, intact    DVT Evaluation: No cords or calf tenderness.  No significant  calf/ankle edema.   Edema: None     Labs  Trended Lab Data:  Recent Labs   Lab 09/30/24  1228 10/04/24  0512   WBC 3.90* 8.31   HGB 11.9* 10.5*   HCT 37.6 32.8*    307   MCV 88.5 86.3   RDW 12.4 12.3   NA  --  138   K  --  3.5   CL  --  107   CO2  --  24   BUN  --  6.1*   CALCIUM  --  8.8   ALBUMIN  --  3.1*   BILITOT  --  1.5   AST  --  22   ALKPHOS  --  98   ALT  --  17       Assessment/Plan  49 y.o. POD# 1 s/p ALYSSA/BS/Cysto doing well.    Neuro: Pain control on scheduled tylenol, toradol -> ibuprofen. PRN oxy 5/10.   Hemodynamics: H/H at presentation 11.9/37.6 -> EBL 50 mL -> 10.5/32.8  Cardiovascular:   BP 130s-150s/80s overnight; will continue to monitor  Respiratory: sp02 97-98% on r/a  GI/FEN: Continue regular diet  Renal/I&O:   UOP 108mL over 12 hours; adequate   Cr 0.70  IVF off at this time   Terry catheter discontinued; patient will have 6hrs to void spontaneously   Infectious Disease: Afebrile, s/p 2g Ancef in OR  Endocrine: No issues  Prophylaxis:  SCDs, IS  Plan is to continue monitoring pt throughout the day; possible discharge this evening or tomorrow AM if meeting all discharge goals     Discussed with staff, Dr. Cooper.       Jaimie Soto MD   PGY2, Obstetrics & Gynecology   [facility]

## 2024-10-04 NOTE — MEDICAL/APP STUDENT
"GYNECOLOGY INPATIENT PROGRESS NOTE    Vanesa Whitlock is a 49 y.o. female  s/p ALYSSA/BS/Cysto. POD1/HD2.       The patient feels well since surgery. Pain is well controlled with current medications. Pt not yet ambulating or voiding spontaneously. Denies passing flatus. Pt tolerated some grapes and jambalaya without nausea or vomiting.     Review of Systems  Endorses nausea but denies vomiting, fevers, chills, headaches, dizziness, chest pain, or shortness of breath.     Medications      acetaminophen  650 mg Oral Q8H    ibuprofen  600 mg Oral Q6H    ketorolac  30 mg Intravenous Q8H    mupirocin   Nasal BID    polyethylene glycol  17 g Oral QHS    scopolamine  1 patch Transdermal Once    senna-docusate 8.6-50 mg  1 tablet Oral BID         0.9% NaCl   Intravenous Continuous        lactated ringers   Intravenous Continuous 10 mL/hr at 10/03/24 0933 New Bag at 10/03/24 0933    lactated ringers   Intravenous Continuous 125 mL/hr at 10/03/24 2208 New Bag at 10/03/24 2208        Current Facility-Administered Medications:     bisacodyL, 10 mg, Rectal, Daily PRN    dextrose 10%, 12.5 g, Intravenous, PRN    dextrose 10%, 25 g, Intravenous, PRN    diphenhydrAMINE, 25 mg, Oral, Q4H PRN    diphenhydrAMINE, 25 mg, Intravenous, Q4H PRN    ondansetron, 4 mg, Oral, Q6H PRN    ondansetron, 4 mg, Intravenous, Q6H PRN    oxyCODONE, 10 mg, Oral, Q4H PRN    oxyCODONE, 5 mg, Oral, Q4H PRN    Objective    Vitals:    10/03/24 2134 10/03/24 2324 10/04/24 0304 10/04/24 0328   BP: (!) 158/88 (!) 158/84  (!) 152/82   BP Location: Left arm      Patient Position: Lying      Pulse: 74 62  61   Resp: 18 18 17 18   Temp: 98.5 °F (36.9 °C) 99.1 °F (37.3 °C)  98.9 °F (37.2 °C)   TempSrc: Oral Oral  Oral   SpO2:  97%  97%   Weight: 64.9 kg (143 lb 1.3 oz)      Height: 5' 1" (1.549 m)        Vitals:    10/03/24 1738 10/03/24 1753 10/03/24 1808 10/03/24 1823   BP: (!) 176/95 (!) 183/96 (!) 162/90 (!) 165/89    10/03/24 1838 10/03/24 1853 10/03/24 " 2132 10/03/24 2134   BP: (!) 169/90 (!) 174/89 (!) 158/88 (!) 158/88    10/03/24 2324 10/04/24 0328   BP: (!) 158/84 (!) 152/82        UOP: 1100 ml overnight     Physical Exam    General: alert and oriented, in no acute distress   Lungs: clear to auscultation bilaterally   Heart:: regular rate and rhythm   Abdomen Soft, appropriately tender post-op, active bowel sounds   Incision: Telfa with overlying tegaderm clean, dry, intact with minimal serosanguinous strikethrough    DVT Evaluation: No cords or calf tenderness.  No significant calf/ankle edema.   Edema: None     Labs  Trended Lab Data:  Recent Labs   Lab 09/30/24  1228 10/04/24  0512   WBC 3.90* 8.31   HGB 11.9* 10.5*   HCT 37.6 32.8*    307   MCV 88.5 86.3   RDW 12.4 12.3   NA  --  138   K  --  3.5   CL  --  107   CO2  --  24   BUN  --  6.1*   CALCIUM  --  8.8   ALBUMIN  --  3.1*   BILITOT  --  1.5   AST  --  22   ALKPHOS  --  98   ALT  --  17       Assessment/Plan  49 y.o. POD# 1 s/p ALYSSA/BS/Cysto doing well.    Neuro: Pain control on scheduled tylenol, toradol -> ibuprofen. PRN oxy 5/10.   Hemodynamics: H/H at presentation 11.9/37.6 -> EBL 50 ml -> 10.5/32.8 AM CBC  Cardiovascular: Hypertensive max 183/90, SpO2 min to 89%   Respiratory: sp02 96% on r/a  GI/FEN: Continue regular diet  Renal/I&O:   UOP 1100 mL over 12 hours  LR IVF @ 10 mL/hr  Infectious Disease: Afebrile, s/p 2g Ancef in OR  Endocrine: No issues   Prophylaxis: SCDs, IS  Plan is to ***    Discussed with staff,  ***    Priscilla Mandel  MS3 [facility] AIDEN

## 2024-10-04 NOTE — OP NOTE
[facility] - [address] [place] [address] Telemetry  General Surgery  Operative Note    SUMMARY     Date of Procedure: 10/3/2024     Procedure:  Total abdominal hysterectomy, bilateral salpingectomy  Cystoscopy      Surgeons and Role:  Amy Cooper MD - Primary  Ricky Guerrero MD - Resident  Mica Kimbrough DO - Jaimie Childress MD - Resident     Assisting Surgeon: None    Pre-Operative Diagnosis:   Abnormal Uterine Bleeding  Uterine Fibroids    Post-Operative Diagnosis:   Abnormal Uterine Bleeding  Uterine Fibroids  Pelvic Adhesive Disease    Anesthesia: General    Operative Findings (including complications, if any):   EUA: Normal external genitalia without lesion. Uterus 22 week size, immobile. Limited descent. No adnexal masses or fullness.   Intraoperatively:  20 week size uterus, irregular in contour with multiple fibroids. Dense adhesions between uterus, bladder and anterior abdominal wall. Ovaries and fallopian tubes normal in appearance. Anterior and posterior cul de sac without lesion.  Cystoscopy: Urethra normal without polyp or lesion. Bladder mucosa visualized globally without lesion, petechiae, lesion, or defect in the trigone, inlet, and dome. Bilateral ureteral orifices visualized with strong efflux of urine and without lesion.    Description of Technical Procedures:     Patient was taken to the operating suite with IV fluids running and placed in supine position. SCDs were applied to bilateral lower extremities for DVT prophylaxis. General anesthesia was then achieved without difficulty. Ancef 2g was given for infection prophylaxis. Patient was then prepped and draped in the usual sterile fashion and a suárez catheter was placed.     A midline vertical incision was made with the scalpel from 2 cm superior to the pubic symphysis to approximately 3 cm above the umbilicus. The incision was carried down to the fascia with the Bovie. The fascia was incised carefully in the midline and extended  superiorly and inferiorly with the bovie. The rectus muscle was then  in the midline. The peritoneum was identified and entered bluntly. The peritoneal incision was then extended superiorly and inferiorly, care was taken to avoid the bladder. The uterus was markedly evident upon entering the peritoneal cavity and noted to be well above the umbilicus. The decision was made to extend the incision another 1-2 cm superiorly to allow for delivery of the uterus. 2 figure of eight sutures with 0 Vicryl were placed into the uterine corpus to allow for manipulation throughout the case. The Placido retractor was placed into the abdominal cavity and noted to be clear of any adhesions or bowel circumferentially within the cavity.     The ureters were identified bilaterally on the medial leaf prior to the start of the hysterectomy and noted to be vermiculating. The remaining fimbriated end of the left fallopian tube was identified, grasped, coagulated and transected using the Enseal. It was handed off the surgical field. The Enseal device was used transect the left uteroovarian ligament. Attention was then turned to the right remaining fimbriated end of the fallopian tube and right uteroovarian ligament, which were then individually coagulated and transected similarly using the Enseal. The fimbriated end of the fallopian tube was handed off the surgical field.     Attention was then paid to adhesiolysis and development of the bladder flap. Carefully, Debakey forceps and Metzenbaum scissors were used to take down dense adhesions between the anterior uterine corpus and the bladder. Meticulous adhesiolysis was performed sharply until the pubocervical fascia was identified.    A window was created in the left broad ligament using the Bovie and the Enseal device was used to transect the broad ligament and skeletonize the uterine vessels to the level of the internal os. The uterine vessels on the left were grasped and ligated  using the Enseal device. The same process was repeated on the right side, skeletonizing the uterine vessels to the level of the internal os to allow for ligation using the Enseal device.    The large fibroid uterus was amputated to aid in visualization of the cervix and handed off the surgical field. The O'Vikas-O'Herzog retractor was then placed. The bowel was packed out of the surgical field using moist laps. The bowel blade and bladder blade were placed respectively taking care to not compress the intraabdominal structures. The cervix was grasped with a Pita for traction.  The cardinal ligament and uterosacral ligaments were sequentially clamped, cut, and suture-ligated using 0-Vicryl. The ibrahima scissors were used to hub the cervix and complete the colpotomy. The cervix was confirmed to be complete on inspection, handed off the field, and the full specimen was sent for pathology.     The vaginal cuff angles were closed with 0-Vicryl sutures, bilateral uterosacral ligaments were incorporated into this closure. The rest of the cuff was then closed using Figure-of-8 0 Vicryl sutures, incorporating the posterior peritoneum into the closure.     The abdomen was then irrigated and the pedicles re-inspected and noted to be hemostatic. The laps and all instruments including the O'Vikas-O'Herzog retractor and the Placido retractor were removed from the abdomen. The rectus muscles were inspected, and noted to be hemostatic. The fascial layer was closed with #1 PDS suture from either angle.  The subcutaneous layer was thoroughly inspected and noted to be hemostatic. The subcutaneous layer was closed using vertical mattress sutures with 0 Vicryl. The skin was reapproximated using 3-0 monocryl in a subcuticular fashion and a dressing was applied.    Attention was then paid to the cystoscopy portion of the procedure.  Terry catheter was removed and saved for reinsertion after cystoscopy.  The cystoscope was inserted  into the bladder and bladder was distended using normal saline.  Complete survey of the bladder was performed as above and strong efflux was noted from bilateral ureteral orifices.  The bladder was drained and cystoscope was removed.  The Terry catheter was reinserted postoperatively prior to procedure completion.     All counts were correct x 2. The patient tolerated the procedure well and was taken to the recovery room in a stable condition s/p extubation.    Estimated Blood Loss (EBL): 40 mL           Implants: * No implants in log *    Specimens:   Specimen (24h ago, onward)       Start     Ordered    10/03/24 1107  Specimen to Pathology  RELEASE UPON ORDERING        References:    Click here for ordering Quick Tip   Question:  Release to patient  Answer:  Immediate    10/03/24 1107                          Condition: Good    Disposition: PACU - hemodynamically stable.    Mica Kimbrough,   [facility] PGY-3

## 2024-10-05 VITALS
SYSTOLIC BLOOD PRESSURE: 154 MMHG | DIASTOLIC BLOOD PRESSURE: 89 MMHG | HEART RATE: 78 BPM | HEIGHT: 61 IN | OXYGEN SATURATION: 98 % | TEMPERATURE: 98 F | WEIGHT: 143.06 LBS | BODY MASS INDEX: 27.01 KG/M2 | RESPIRATION RATE: 19 BRPM

## 2024-10-05 PROCEDURE — G0378 HOSPITAL OBSERVATION PER HR: HCPCS

## 2024-10-05 PROCEDURE — 25000003 PHARM REV CODE 250

## 2024-10-05 PROCEDURE — 94799 UNLISTED PULMONARY SVC/PX: CPT

## 2024-10-05 PROCEDURE — 99900035 HC TECH TIME PER 15 MIN (STAT)

## 2024-10-05 PROCEDURE — 94761 N-INVAS EAR/PLS OXIMETRY MLT: CPT

## 2024-10-05 RX ORDER — ACETAMINOPHEN 500 MG
1000 TABLET ORAL EVERY 6 HOURS
Qty: 30 TABLET | Refills: 0 | Status: SHIPPED | OUTPATIENT
Start: 2024-10-05

## 2024-10-05 RX ORDER — OXYCODONE HYDROCHLORIDE 5 MG/1
5 TABLET ORAL EVERY 4 HOURS PRN
Qty: 20 TABLET | Refills: 0 | Status: SHIPPED | OUTPATIENT
Start: 2024-10-05 | End: 2024-10-05

## 2024-10-05 RX ORDER — ONDANSETRON 4 MG/1
4 TABLET, ORALLY DISINTEGRATING ORAL EVERY 6 HOURS PRN
Qty: 10 TABLET | Refills: 0 | Status: SHIPPED | OUTPATIENT
Start: 2024-10-05

## 2024-10-05 RX ORDER — SIMETHICONE 125 MG
125 CAPSULE ORAL 4 TIMES DAILY PRN
Qty: 30 CAPSULE | Refills: 0 | Status: SHIPPED | OUTPATIENT
Start: 2024-10-05

## 2024-10-05 RX ORDER — IBUPROFEN 800 MG/1
800 TABLET ORAL EVERY 6 HOURS
Qty: 45 TABLET | Refills: 0 | Status: SHIPPED | OUTPATIENT
Start: 2024-10-05 | End: 2024-10-16

## 2024-10-05 RX ORDER — SENNOSIDES 8.6 MG/1
1 TABLET ORAL DAILY
Qty: 30 TABLET | Refills: 0 | Status: SHIPPED | OUTPATIENT
Start: 2024-10-05 | End: 2024-11-04

## 2024-10-05 RX ORDER — OXYCODONE HYDROCHLORIDE 5 MG/1
5 TABLET ORAL EVERY 4 HOURS PRN
Qty: 20 TABLET | Refills: 0 | Status: SHIPPED | OUTPATIENT
Start: 2024-10-05

## 2024-10-05 RX ADMIN — IBUPROFEN 600 MG: 600 TABLET, FILM COATED ORAL at 06:10

## 2024-10-05 RX ADMIN — SENNOSIDES AND DOCUSATE SODIUM 1 TABLET: 50; 8.6 TABLET ORAL at 08:10

## 2024-10-05 RX ADMIN — OXYCODONE HYDROCHLORIDE 10 MG: 5 TABLET ORAL at 08:10

## 2024-10-05 RX ADMIN — IBUPROFEN 600 MG: 600 TABLET, FILM COATED ORAL at 12:10

## 2024-10-05 NOTE — DISCHARGE SUMMARY
[facility] - [address] [place] [address] Telemetry  Obstetrics & Gynecology  Discharge Summary    Patient Name: Vanesa Whitlock  MRN: [id number]  Admission Date: 10/3/2024  Hospital Length of Stay: 2 days  Discharge Date and Time:  10/05/2024 9:03 AM  Discharging Provider: Dr. Marin Rogers  Primary Care Provider: Sabrina Primary Doctor    Hospital Course: 50 yo  who presented for scheduled ALYSSA/BS/Cysto. Surgery was uncomplicated. EBL 40 mL. See op note for further details. She was admitted for inpatient observation post-operatively. Patient was found to be meeting all goals for discharge on POD#2. She is ambulating, voiding spontaneously, tolerating regular PO diet, pain is controlled with PO medications, and passing flatus.     Procedure(s) (LRB):  HYSTERECTOMY, TOTAL, ABDOMINAL (N/A)  CYSTOSCOPY (N/A)     Consults: None    Significant Diagnostic Studies: N/A    Pending Diagnostic Studies:       None          Final Active Diagnoses:    Diagnosis Date Noted POA    PRINCIPAL PROBLEM:  Uterine leiomyoma [D25.9] 2024 Yes    Abnormal uterine bleeding (AUB) [N93.9] 10/04/2024 Yes    Symptomatic anemia [D64.9] 2024 Yes      Problems Resolved During this Admission:        Discharged Condition: good    Disposition: Home or Self Care    Follow Up: As scheduled in clinic on 10/11/2024.     Patient Instructions:      Diet Adult Regular     Lifting restrictions   Order Comments: NO lifting > 5 lbs     No driving until:   Order Comments: No driving if taking opioid medications or in pain     Pelvic Rest   Order Comments: Nothing in the vagina x 6 weeks or until cleared by your provider. No baths, swimming, showers okay     Notify your health care provider if you experience any of the following:  temperature >100.4     Notify your health care provider if you experience any of the following:  persistent nausea and vomiting or diarrhea     Notify your health care provider if you experience any of the following:  severe  uncontrolled pain     Notify your health care provider if you experience any of the following:  redness, tenderness, or signs of infection (pain, swelling, redness, odor or green/yellow discharge around incision site)     Notify your health care provider if you experience any of the following:  difficulty breathing or increased cough     Notify your health care provider if you experience any of the following:  severe persistent headache     Notify your health care provider if you experience any of the following:  worsening rash     Notify your health care provider if you experience any of the following:   Order Comments: Heavy vaginal bleeding. Soaking a pad an hour x 2 hours in a row     Activity as tolerated     Medications:  Reconciled Home Medications:      Medication List        START taking these medications      acetaminophen 500 MG tablet  Commonly known as: TYLENOL  Take 2 tablets (1,000 mg total) by mouth every 6 (six) hours.  Replaces: acetaminophen 160 mg/5 mL (5 mL) Susp     ibuprofen 800 MG tablet  Commonly known as: ADVIL,MOTRIN  Take 1 tablet (800 mg total) by mouth every 6 (six) hours. for 11 days     oxyCODONE 5 MG immediate release tablet  Commonly known as: ROXICODONE  Take 1 tablet (5 mg total) by mouth every 4 (four) hours as needed for Pain.     senna 8.6 mg tablet  Commonly known as: SENNA  Take 1 tablet by mouth once daily.     simethicone 125 mg Cap capsule  Commonly known as: MYLICON  Take 1 capsule (125 mg total) by mouth 4 (four) times daily as needed for Flatulence.            CONTINUE taking these medications      butalbital-acetaminophen-caffeine -40 mg -40 mg per tablet  Commonly known as: FIORICET, ESGIC  Take 1 tablet by mouth every 4 (four) hours as needed for Pain.            STOP taking these medications      acetaminophen 160 mg/5 mL (5 mL) Susp  Commonly known as: TYLENOL  Replaced by: acetaminophen 500 MG tablet              Mica Kimbrough DO  [employer] [facility] PGY-3

## 2024-10-05 NOTE — PLAN OF CARE
Problem: Adult Inpatient Plan of Care  Goal: Plan of Care Review  Outcome: Progressing  Flowsheets (Taken 10/5/2024 5715)  Plan of Care Reviewed With:   patient   parent  Goal: Patient-Specific Goal (Individualized)  Outcome: Progressing  Goal: Absence of Hospital-Acquired Illness or Injury  Outcome: Progressing  Goal: Optimal Comfort and Wellbeing  Outcome: Progressing  Goal: Readiness for Transition of Care  Outcome: Progressing     Problem: Infection  Goal: Absence of Infection Signs and Symptoms  Outcome: Progressing     Problem: Wound  Goal: Optimal Coping  Outcome: Progressing  Goal: Optimal Functional Ability  Outcome: Progressing  Goal: Absence of Infection Signs and Symptoms  Outcome: Progressing  Goal: Improved Oral Intake  Outcome: Progressing  Goal: Optimal Pain Control and Function  Outcome: Progressing  Goal: Skin Health and Integrity  Outcome: Progressing  Goal: Optimal Wound Healing  Outcome: Progressing     Problem: Fall Injury Risk  Goal: Absence of Fall and Fall-Related Injury  Outcome: Progressing

## 2024-10-07 LAB
ESTROGEN SERPL-MCNC: NORMAL PG/ML
INSULIN SERPL-ACNC: NORMAL U[IU]/ML
LAB AP CLINICAL INFORMATION: NORMAL
LAB AP GROSS DESCRIPTION: NORMAL
LAB AP REPORT FOOTNOTES: NORMAL
T3RU NFR SERPL: NORMAL %

## 2024-10-11 ENCOUNTER — OFFICE VISIT (OUTPATIENT)
Dept: GYNECOLOGY | Facility: CLINIC | Age: 49
End: 2024-10-11
Payer: OTHER GOVERNMENT

## 2024-10-11 VITALS
OXYGEN SATURATION: 97 % | DIASTOLIC BLOOD PRESSURE: 84 MMHG | RESPIRATION RATE: 18 BRPM | HEART RATE: 84 BPM | WEIGHT: 137.81 LBS | BODY MASS INDEX: 26.02 KG/M2 | HEIGHT: 61 IN | SYSTOLIC BLOOD PRESSURE: 118 MMHG | TEMPERATURE: 98 F

## 2024-10-11 DIAGNOSIS — Z09 POSTOP CHECK: Primary | ICD-10-CM

## 2024-10-11 DIAGNOSIS — Z90.710 STATUS POST HYSTERECTOMY: ICD-10-CM

## 2024-10-11 PROCEDURE — 99215 OFFICE O/P EST HI 40 MIN: CPT | Mod: PBBFAC

## 2024-10-11 NOTE — PROGRESS NOTES
"U OB/GYN CLINIC NOTE  [facility]  [address]  [place] [address]  Phone: [phone]  Fax: [phone]    Postoperative Follow-Up    Subjective:      Vanesa Whitlock is a 49 y.o.  s/p ALYSSA/BS/Cysto 2/2 AUB-L on 10/3/24 who presents to the clinic 1 week post-op.     She reports she is doing well. Has low appetite but has been able to tolerate fruits, vegetables, and chicken without nausea or vomiting. Endorses abdominal pain/soreness at incision site. Stopped taking oxycodone after 3 days post-op due to drowsiness and states she did not  the ibuprofen following hospital discharge.  Reports 1st bowel movement since surgery was yesterday, denies hard stools. No episodes of vaginal bleeding. Ambulating and voiding without difficulty.     Patient's medications, allergies, past medical, surgical, social and family histories were reviewed and updated as appropriate.    Review of Systems  Reports subjective fever, chills, and nausea but denies headache, blurry vision, vomiting, dizziness, or syncope.  Denies chest pain, shortness of breath, RUQ pain, or calf pain.     Objective:     Vitals:    10/11/24 1017   BP: 118/84   Pulse: 84   Resp: 18   Temp: 98.1 °F (36.7 °C)   TempSrc: Oral   SpO2: 97%   Weight: 62.5 kg (137 lb 12.8 oz)   Height: 5' 1" (1.549 m)       Physical Exam:     General: alert and oriented, in no acute distress  Lungs: clear to auscultation bilaterally, no conversational dyspnea  Heart: regular rate and rhythm  Abdomen: Soft, non-distended, tender to palpation, no involuntary guarding, no rebound tenderness  Incision Sites: Midline vertical incision site clean/dry/intact and healing well. No erythema or discharge  Extremities: Normal, atraumatic, non-edematous, No cords or calf tenderness, No significant calf/ankle edema    Note: RN chaperone present for entirety of exam.    Operative Findings:  EBL: 40 mL  Uterus: 996 g    Pathology:  Final Diagnosis      Uterus, cervix and bilateral " fallopian tubes, 996 g, Excision:  - Cervix:  Nabothian cysts.  - Endometrium:  Basalis endometrium.  - Myometrium:   Leiomyomata.  - Bilateral Fallopian tubes:   No significant histopathologic abnormality.       Assessment:     Vanesa Whitlock is a 49 y.o.  s/p ALYSSA/BS/Cysto 2/2 AUB-L on 10/3/24. Doing well post-operatively.  Operative findings again reviewed. Pathology report discussed.     Plan:     Continue any current medications.  Encourage patient to  ibuprofen prescription and add to current medication regimen  Wound care discussed.  Activity restrictions: nothing over 5 lbs, activity as tolerated  Anticipated return to work: 6 weeks postop  Follow up: 11/15/24 for 2nd postop and cuff check    Priscilla Mandel  MS3 [facility] [facility]    Patient and plan were discussed with Dr. Cooper.    Ricky Guerrero MD  [employer] [facility]  PGY-4

## 2024-10-20 ENCOUNTER — HOSPITAL ENCOUNTER (EMERGENCY)
Facility: HOSPITAL | Age: 49
Discharge: HOME | End: 2024-10-20
Attending: SPECIALIST
Payer: OTHER GOVERNMENT

## 2024-10-20 VITALS
RESPIRATION RATE: 20 BRPM | SYSTOLIC BLOOD PRESSURE: 124 MMHG | BODY MASS INDEX: 25.49 KG/M2 | TEMPERATURE: 98 F | OXYGEN SATURATION: 100 % | DIASTOLIC BLOOD PRESSURE: 77 MMHG | HEART RATE: 96 BPM | HEIGHT: 61 IN | WEIGHT: 135 LBS

## 2024-10-20 DIAGNOSIS — K59.01 SLOW TRANSIT CONSTIPATION: Primary | ICD-10-CM

## 2024-10-20 PROCEDURE — 96372 THER/PROPH/DIAG INJ SC/IM: CPT | Performed by: SPECIALIST

## 2024-10-20 PROCEDURE — 99284 EMERGENCY DEPT VISIT MOD MDM: CPT | Mod: 25

## 2024-10-20 PROCEDURE — 63600175 PHARM REV CODE 636 W HCPCS: Performed by: SPECIALIST

## 2024-10-20 PROCEDURE — 25000003 PHARM REV CODE 250: Performed by: SPECIALIST

## 2024-10-20 RX ORDER — LACTULOSE 10 G/15ML
20 SOLUTION ORAL 3 TIMES DAILY PRN
Qty: 600 ML | Refills: 0 | Status: SHIPPED | OUTPATIENT
Start: 2024-10-20

## 2024-10-20 RX ORDER — LACTULOSE 10 G/15ML
30 SOLUTION ORAL
Status: COMPLETED | OUTPATIENT
Start: 2024-10-20 | End: 2024-10-20

## 2024-10-20 RX ORDER — ONDANSETRON HYDROCHLORIDE 2 MG/ML
8 INJECTION, SOLUTION INTRAVENOUS
Status: COMPLETED | OUTPATIENT
Start: 2024-10-20 | End: 2024-10-20

## 2024-10-20 RX ORDER — ONDANSETRON 4 MG/1
8 TABLET, ORALLY DISINTEGRATING ORAL
Status: DISCONTINUED | OUTPATIENT
Start: 2024-10-20 | End: 2024-10-20 | Stop reason: HOSPADM

## 2024-10-20 RX ADMIN — LACTULOSE 30 G: 10 SOLUTION ORAL at 01:10

## 2024-10-20 RX ADMIN — ONDANSETRON 8 MG: 2 INJECTION INTRAMUSCULAR; INTRAVENOUS at 01:10

## 2024-10-20 NOTE — ED NOTES
C/o right abd. Pain. No bowel movement regularly for last seven days. States did fleets and stool softner without results. Also nausea but was given zofran.

## 2024-10-20 NOTE — ED PROVIDER NOTES
Encounter Date: 10/19/2024       History     Chief Complaint   Patient presents with    Constipation    Abdominal Pain    Emesis     Patient had tubes tied last week, c/o constipation for last 7 days. Reports RLQ abdominal pain and N/V for last 3 days. Denies blood or feces in emesis. Patient had f/u with surgeon and was given miralax, no other complications postop per patient.      49-year-old female states she had her tubes tied last week and has had constipation for 7 days complains of right lower abdominal pain over the last 3 days; records reveal she actually had a total hysterectomy 2-1/2 weeks ago; she has had some bowel movements but not regular; she had follow up with the surgeon postop and was given MiraLax    The history is provided by the patient and a relative (Sister).     Review of patient's allergies indicates:   Allergen Reactions    Phenergan plain      Drowsiness     Past Medical History:   Diagnosis Date    Anxiety disorder     Iron deficiency anemia     Migraine headache with aura     Vitamin D deficiency      Past Surgical History:   Procedure Laterality Date    BILATERAL TUBAL LIGATION      at time of CS#3     SECTION      COLONOSCOPY N/A 2024    Procedure: COLONOSCOPY;  Surgeon: BRIANNA Price MD;  Location: [facility] ENDOSCOPY;  Service: Gastroenterology;  Laterality: N/A;    CYSTOSCOPY N/A 10/3/2024    Procedure: CYSTOSCOPY;  Surgeon: Amy Cooper MD;  Location: [facility] OR;  Service: OB/GYN;  Laterality: N/A;    TOTAL ABDOMINAL HYSTERECTOMY N/A 10/3/2024    Procedure: HYSTERECTOMY, TOTAL, ABDOMINAL;  Surgeon: Amy Cooper MD;  Location: [facility] OR;  Service: OB/GYN;  Laterality: N/A;  Admit/Salping  O'aurelia, have Placido avail     Family History   Problem Relation Name Age of Onset    Hypertension Father      Breast cancer Maternal Grandmother  93    Colon cancer Neg Hx      Ovarian cancer Neg Hx      Uterine cancer Neg Hx       Social History     Tobacco Use    Smoking  status: Never    Smokeless tobacco: Never   Substance Use Topics    Alcohol use: Not Currently    Drug use: Never     Review of Systems   Constitutional: Negative.    HENT: Negative.     Respiratory: Negative.     Cardiovascular: Negative.    Gastrointestinal: Negative.    Musculoskeletal: Negative.    Skin: Negative.    Neurological: Negative.    Psychiatric/Behavioral:  The patient is nervous/anxious.    All other systems reviewed and are negative.      Physical Exam     Initial Vitals [10/20/24 0007]   BP Pulse Resp Temp SpO2   124/77 96 20 98 °F (36.7 °C) 100 %      MAP       --         Physical Exam    Nursing note and vitals reviewed.  Constitutional: She appears well-developed and well-nourished.   HENT:   Head: Normocephalic and atraumatic.   Eyes: EOM are normal. Pupils are equal, round, and reactive to light.   Neck: Neck supple.   Normal range of motion.  Cardiovascular:  Normal rate, regular rhythm, normal heart sounds and intact distal pulses.           Pulmonary/Chest: Breath sounds normal.   Abdominal: Abdomen is soft. Bowel sounds are normal. She exhibits no distension. There is abdominal tenderness (Mild, generalized).   Rectal- stool high up There is no rebound and no guarding.   Musculoskeletal:         General: Normal range of motion.      Cervical back: Normal range of motion and neck supple.     Neurological: She is alert and oriented to person, place, and time. She has normal strength. GCS score is 15. GCS eye subscore is 4. GCS verbal subscore is 5. GCS motor subscore is 6.   Skin: Skin is warm and dry.         ED Course   Procedures  Labs Reviewed - No data to display       Imaging Results    None          Medications   ondansetron disintegrating tablet 8 mg (8 mg Oral Not Given 10/20/24 0115)   ondansetron injection 8 mg (8 mg Intramuscular Given 10/20/24 0101)   lactulose 10 gram/15 ml solution 30 g (30 g Oral Given 10/20/24 0100)     Medical Decision Making  49-year-old female states she  "had her tubes tied last week and has had constipation for 7 days complains of right lower abdominal pain over the last 3 days; has had some bowel movements fence not regular; she had follow up with the surgeon postop and was given MiraLax    DIFFERENTIAL DIAGNOSIS- constipation    Risk  Prescription drug management.  Risk Details: Patient able to generate a bowel movement with Fleet's enema; patient was given lactulose and a prescription was sent to the pharmacy; patient understands she needs to drink plenty of liquids and fluids; her sister asked to we could start an IV and I explained there is a national shortage of IV fluids and we are only using fluids for low blood pressures and critical care patients       Patient Vitals for the past 24 hrs:   BP Temp Temp src Pulse Resp SpO2 Height Weight   10/20/24 0007 124/77 98 °F (36.7 °C) Oral 96 20 100 % 5' 1" (1.549 m) 61.2 kg (135 lb)                 The patient is resting comfortably and in no acute distress.  She states that her symptoms have improved after treatment in Emergency Department. I personally discussed her test results and treatment plan.  Gave strict ED precautions.  Specific conditions for return to the emergency department and importance of follow up with her primary care provided or the physician listed on the discharge instructions.  Patient voices understanding and agrees to the plan discussed. All patients' questions have been answered at this time.   She has remained hemodynamically stable throughout entire stay in ED and is stable for discharge home.                   Clinical Impression:  Final diagnoses:  [K59.01] Slow transit constipation (Primary)          ED Disposition Condition    Discharge Stable          ED Prescriptions       Medication Sig Dispense Start Date End Date Auth. Provider    lactulose (CHRONULAC) 20 gram/30 mL Soln Take 30 mLs (20 g total) by mouth 3 (three) times daily as needed (Constipation). 600 mL 10/20/2024 -- Dial, " Pablo KUO MD    hydrocortisone-pramoxine (PROCTOFOAM-HS) rectal foam Place 1 applicator rectally 2 (two) times daily. 10 g 10/20/2024 -- Pablo Fraire MD          Follow-up Information       Follow up With Specialties Details Why Contact Info    [facility] Emergency [facility]  As needed [address] [id number] [phone]             Pablo Fraire MD  10/20/24 0203       Pablo Fraire MD  10/20/24 0249

## 2024-10-20 NOTE — ED NOTES
Apologize for not being able to do more. She was give prescription for zofran and  adwoa foam for hemorrhoid. Help place the patient in the car. They said going [facility] cause that's where she had surgery.

## 2024-11-15 ENCOUNTER — OFFICE VISIT (OUTPATIENT)
Dept: GYNECOLOGY | Facility: CLINIC | Age: 49
End: 2024-11-15
Payer: OTHER GOVERNMENT

## 2024-11-15 VITALS
HEIGHT: 61 IN | WEIGHT: 137 LBS | BODY MASS INDEX: 25.86 KG/M2 | HEART RATE: 65 BPM | SYSTOLIC BLOOD PRESSURE: 123 MMHG | OXYGEN SATURATION: 100 % | RESPIRATION RATE: 20 BRPM | DIASTOLIC BLOOD PRESSURE: 79 MMHG | TEMPERATURE: 98 F

## 2024-11-15 DIAGNOSIS — N89.8 VAGINAL DISCHARGE: Primary | ICD-10-CM

## 2024-11-15 DIAGNOSIS — Z90.710 STATUS POST HYSTERECTOMY: ICD-10-CM

## 2024-11-15 LAB
CLUE CELLS VAG QL WET PREP: ABNORMAL
T VAGINALIS VAG QL WET PREP: ABNORMAL
WBC #/AREA VAG WET PREP: ABNORMAL
YEAST SPEC QL WET PREP: ABNORMAL

## 2024-11-15 PROCEDURE — 87210 SMEAR WET MOUNT SALINE/INK: CPT

## 2024-11-15 PROCEDURE — 99215 OFFICE O/P EST HI 40 MIN: CPT | Mod: PBBFAC

## 2024-11-15 RX ORDER — ERGOCALCIFEROL 1.25 MG/1
1250 CAPSULE ORAL
COMMUNITY
Start: 2024-04-29

## 2024-11-15 RX ORDER — POLYETHYLENE GLYCOL 3350 17 G/17G
17 POWDER, FOR SOLUTION ORAL DAILY
Qty: 30 EACH | Refills: 2 | Status: SHIPPED | OUTPATIENT
Start: 2024-11-15

## 2024-11-15 RX ORDER — LANOLIN ALCOHOL/MO/W.PET/CERES
1000 CREAM (GRAM) TOPICAL
COMMUNITY
Start: 2024-04-29

## 2024-11-15 RX ORDER — AMOXICILLIN 250 MG
2 CAPSULE ORAL DAILY
COMMUNITY
Start: 2024-04-29

## 2024-11-15 RX ORDER — CAPSAICIN 0.75 MG/G
CREAM TOPICAL
COMMUNITY
Start: 2024-04-29

## 2024-11-15 NOTE — PROGRESS NOTES
"U OB/GYN CLINIC NOTE  [facility]  [address] [place] [address]  Phone: [phone]  Fax: [phone]    Postoperative Follow-Up    Subjective:      Vanesa Whitlock is a 49 y.o.  s/p ALYSSA/BS/Cysto 2/2 AUB-L on 10/3/24 who presents to the clinic 6 weeks post-op.    Reports constipation, having 1 bowel movement per week, not taking bowel regimen consistently. Prior to surgery was having 1-2 bowel movements per week, not a new problem. Eating a regular diet without difficulty. Patient is complaining of some lower abdominal discomfort but reports not taking any pain meds at home. No episodes of vaginal bleeding.    Patient's medications, allergies, past medical, surgical, social and family histories were reviewed and updated as appropriate.    Review of Systems  Denies fever, chills, nausea, emesis, headache, blurry vision, vomiting, dizziness, or syncope.  Denies chest pain, shortness of breath, RUQ pain, or calf pain.    Objective:     Vitals:    11/15/24 0805   BP: 123/79   BP Location: Right arm   Patient Position: Sitting   Pulse: 65   Resp: 20   Temp: 97.9 °F (36.6 °C)   SpO2: 100%   Weight: 62.1 kg (137 lb)   Height: 5' 1" (1.549 m)     Physical Exam:    General: Alert and oriented, in no acute distress.  Lungs: Clear to auscultation bilaterally, no conversational dyspnea.  Heart: Regular rate and rhythm.  Abdomen: Soft, non-distended, minimally tender to LLQ palpation, no involuntary guarding, no rebound tenderness.  Incision Sites: Midline vertical incision site clean/dry/intact, well healed. No erythema or discharge.  Extremities: Normal, atraumatic, non-edematous, No cords or calf tenderness, No significant calf/ankle edema.  Pelvic: Vaginal cuff intact and well-healed, no defects visualized or palpated on digital exam, moderate creamy white discharge in vault.    Note: RN chaperone present for entirety of exam.    Operative Findings:  EBL: 40 mL  Uterus: 996 g    EUA: Normal external genitalia " without lesion. Uterus 22 week size, immobile. Limited descent. No adnexal masses or fullness.  Intraoperatively:  20 week size uterus, irregular in contour with multiple fibroids. Dense adhesions between uterus, bladder and anterior abdominal wall. Ovaries and fallopian tubes normal in appearance. Anterior and posterior cul de sac without lesion.  Cystoscopy: Urethra normal without polyp or lesion. Bladder mucosa visualized globally without lesion, petechiae, lesion, or defect in the trigone, inlet, and dome. Bilateral ureteral orifices visualized with strong efflux of urine and without lesion.    Pathology:  Final Diagnosis      Uterus, cervix and bilateral fallopian tubes, 996 g, Excision:  - Cervix:  Nabothian cysts.  - Endometrium:  Basalis endometrium.  - Myometrium:   Leiomyomata.  - Bilateral Fallopian tubes:   No significant histopathologic abnormality.     Assessment:     Vanesa Whitlock is a 49 y.o.  s/p ALYSSA/BS/Cysto 2/2 AUB-L on 10/3/24. Doing well post-operatively.  Operative findings again reviewed. Pathology report discussed.     Plan:     Continue any current medications.  Encouraged daily bowel regimen for prevention of constipation.   Miralax rx sent for constipation, counseled on scheduled bowel regimen for prevention rather than treatment of constipation prn. Continue senna as well.  Discussed use of tylenol/NSAIDs for pain management and that discomfort likely 2/2 constipation.  Wound care discussed.  Activity restrictions: None  Anticipated return to work: Now.  Follow up: 1 year for WWE with NP.    Patient and plan were discussed with Dr. Bazzi.    Selvin Ornelas MD  [employer] [facility], PGY-3

## 2025-09-02 DIAGNOSIS — Z12.31 SCREENING MAMMOGRAM, ENCOUNTER FOR: Primary | ICD-10-CM

## (undated) DEVICE — ELECTRODE PATIENT RETURN DISP

## (undated) DEVICE — SUT 0 8-27IN VICRYL PL CT-1

## (undated) DEVICE — MANIFOLD 4 PORT

## (undated) DEVICE — PAD CURAD NONADH 3X4IN

## (undated) DEVICE — BINDER ABDOM 4PANEL 12IN SM/MD

## (undated) DEVICE — NDL ANES SPINAL 18X3.5ST 18G

## (undated) DEVICE — GLOVE SIGNATURE MICRO LTX 6

## (undated) DEVICE — GLOVE SENSICARE PI GRN 6.5

## (undated) DEVICE — APPLICATOR CHLORAPREP ORN 26ML

## (undated) DEVICE — BLADE SURG STAINLESS STEEL #10

## (undated) DEVICE — Device

## (undated) DEVICE — DEVICE ENSEAL X1 LARGE JAW

## (undated) DEVICE — SYR 10CC LUER LOCK

## (undated) DEVICE — DRAPE LEGGINGS CUFF 31X48IN

## (undated) DEVICE — KIT SURGICAL COLON .25 1.1OZ

## (undated) DEVICE — PAD PREP CUFFED NS 24X48IN

## (undated) DEVICE — SPONGE LAP 18X18 PREWASHED

## (undated) DEVICE — SCRUB DYNA-HEX LIQ 4% CHG 4OZ

## (undated) DEVICE — SUT 1 36IN PDS II

## (undated) DEVICE — GLOVE SIGNATURE MICRO LTX 6.5

## (undated) DEVICE — DRSNG POLYSKIN TRNSPAR 4X4.75

## (undated) DEVICE — SOL NORMAL USPCA 0.9%

## (undated) DEVICE — GOWN POLY REINF BRTH SLV XL

## (undated) DEVICE — SUT MONOCRYL PLUS UD 3-0 27

## (undated) DEVICE — DRAPE INCISE IOBAN 2 23X17IN

## (undated) DEVICE — STAPLER SKIN ROTATING HEAD

## (undated) DEVICE — JELLY SURGILUBE LUBE PKT 3GM

## (undated) DEVICE — GLOVE SENSICARE NEOPRENE 6.5

## (undated) DEVICE — GLOVE SENSICARE PI GRN 6

## (undated) DEVICE — SYR 50CC LL

## (undated) DEVICE — GLOVE SENSICARE PI GRN 7

## (undated) DEVICE — SOL NACL IRR 1000ML BTL

## (undated) DEVICE — TOWEL OR DISP STRL BLUE 4/PK

## (undated) DEVICE — SYR ONLY LUER LOCK 20CC

## (undated) DEVICE — GLOVE SENSICARE PI GRN 7.5

## (undated) DEVICE — ADHESIVE DERMABOND ADVANCED

## (undated) DEVICE — DRAPE UNDERBUTTOCKS PCH STRL

## (undated) DEVICE — SET CYSTO IRR DRP CHMBR 84IN

## (undated) DEVICE — TRAY CATH FOL SIL URIMTR 16FR

## (undated) DEVICE — GLOVE PROTEXIS LTX MICRO  7

## (undated) DEVICE — TRAY SKIN SCRUB WET PREMIUM

## (undated) DEVICE — COVER MAYO STAND REINFRCD 30

## (undated) DEVICE — KIT SURGICAL TURNOVER

## (undated) DEVICE — COVER CAMERA 21X16X19IN